# Patient Record
Sex: FEMALE | Race: WHITE | Employment: PART TIME | URBAN - METROPOLITAN AREA
[De-identification: names, ages, dates, MRNs, and addresses within clinical notes are randomized per-mention and may not be internally consistent; named-entity substitution may affect disease eponyms.]

---

## 2017-06-12 PROBLEM — M19.012 OSTEOARTHRITIS OF LEFT SHOULDER: Status: ACTIVE | Noted: 2017-06-12

## 2018-04-30 RX ORDER — CLINDAMYCIN HYDROCHLORIDE 300 MG/1
300 CAPSULE ORAL SEE ADMIN INSTRUCTIONS
COMMUNITY

## 2018-04-30 RX ORDER — AMMONIUM LACTATE 12 G/100G
LOTION TOPICAL
Refills: 0 | COMMUNITY
Start: 2018-04-09

## 2018-04-30 RX ORDER — GLIMEPIRIDE 2 MG/1
2 TABLET ORAL
COMMUNITY

## 2018-05-08 ENCOUNTER — ANESTHESIA EVENT (OUTPATIENT)
Dept: OPERATING ROOM | Age: 73
End: 2018-05-08
Payer: MEDICARE

## 2018-05-08 ENCOUNTER — HOSPITAL ENCOUNTER (OUTPATIENT)
Age: 73
Setting detail: OUTPATIENT SURGERY
Discharge: HOME | End: 2018-05-08
Attending: SURGERY | Admitting: SURGERY
Payer: MEDICARE

## 2018-05-08 ENCOUNTER — ANESTHESIA (OUTPATIENT)
Dept: OPERATING ROOM | Age: 73
End: 2018-05-08
Payer: MEDICARE

## 2018-05-08 VITALS
HEART RATE: 88 BPM | DIASTOLIC BLOOD PRESSURE: 74 MMHG | BODY MASS INDEX: 38.45 KG/M2 | SYSTOLIC BLOOD PRESSURE: 165 MMHG | TEMPERATURE: 97.1 F | RESPIRATION RATE: 16 BRPM | WEIGHT: 245 LBS | OXYGEN SATURATION: 95 % | HEIGHT: 67 IN

## 2018-05-08 VITALS
OXYGEN SATURATION: 94 % | DIASTOLIC BLOOD PRESSURE: 96 MMHG | SYSTOLIC BLOOD PRESSURE: 189 MMHG | RESPIRATION RATE: 4 BRPM | TEMPERATURE: 96.6 F

## 2018-05-08 DIAGNOSIS — K43.2 RECURRENT VENTRAL INCISIONAL HERNIA: Primary | ICD-10-CM

## 2018-05-08 DIAGNOSIS — G89.18 POSTOPERATIVE ABDOMINAL PAIN: ICD-10-CM

## 2018-05-08 DIAGNOSIS — Z98.890 STATUS POST REPAIR OF RECURRENT VENTRAL HERNIA: ICD-10-CM

## 2018-05-08 DIAGNOSIS — R10.9 POSTOPERATIVE ABDOMINAL PAIN: ICD-10-CM

## 2018-05-08 DIAGNOSIS — Z87.19 STATUS POST REPAIR OF RECURRENT VENTRAL HERNIA: ICD-10-CM

## 2018-05-08 LAB
ANION GAP SERPL CALCULATED.3IONS-SCNC: 16 MMOL/L (ref 7–16)
BUN BLDV-MCNC: 25 MG/DL (ref 8–23)
CALCIUM SERPL-MCNC: 9.3 MG/DL (ref 8.6–10.2)
CHLORIDE BLD-SCNC: 99 MMOL/L (ref 98–107)
CO2: 23 MMOL/L (ref 22–29)
CREAT SERPL-MCNC: 0.7 MG/DL (ref 0.5–1)
GFR AFRICAN AMERICAN: >60
GFR NON-AFRICAN AMERICAN: >60 ML/MIN/1.73
GLUCOSE BLD-MCNC: 169 MG/DL (ref 74–109)
HCT VFR BLD CALC: 37.8 % (ref 34–48)
HEMOGLOBIN: 12.6 G/DL (ref 11.5–15.5)
MCH RBC QN AUTO: 30.4 PG (ref 26–35)
MCHC RBC AUTO-ENTMCNC: 33.3 % (ref 32–34.5)
MCV RBC AUTO: 91.1 FL (ref 80–99.9)
METER GLUCOSE: 155 MG/DL (ref 70–110)
PDW BLD-RTO: 13.2 FL (ref 11.5–15)
PLATELET # BLD: 166 E9/L (ref 130–450)
PMV BLD AUTO: 11.2 FL (ref 7–12)
POTASSIUM SERPL-SCNC: 3.7 MMOL/L (ref 3.5–5)
RBC # BLD: 4.15 E12/L (ref 3.5–5.5)
SODIUM BLD-SCNC: 138 MMOL/L (ref 132–146)
WBC # BLD: 7.8 E9/L (ref 4.5–11.5)

## 2018-05-08 PROCEDURE — 6370000000 HC RX 637 (ALT 250 FOR IP): Performed by: ANESTHESIOLOGY

## 2018-05-08 PROCEDURE — L0625 LO FLEX L1-BELOW L5 PRE OTS: HCPCS | Performed by: SURGERY

## 2018-05-08 PROCEDURE — 6360000002 HC RX W HCPCS: Performed by: ANESTHESIOLOGY

## 2018-05-08 PROCEDURE — 3700000001 HC ADD 15 MINUTES (ANESTHESIA): Performed by: SURGERY

## 2018-05-08 PROCEDURE — 80048 BASIC METABOLIC PNL TOTAL CA: CPT

## 2018-05-08 PROCEDURE — 85027 COMPLETE CBC AUTOMATED: CPT

## 2018-05-08 PROCEDURE — 82962 GLUCOSE BLOOD TEST: CPT

## 2018-05-08 PROCEDURE — 2580000003 HC RX 258: Performed by: SURGERY

## 2018-05-08 PROCEDURE — 2500000003 HC RX 250 WO HCPCS: Performed by: SURGERY

## 2018-05-08 PROCEDURE — 6360000002 HC RX W HCPCS: Performed by: NURSE ANESTHETIST, CERTIFIED REGISTERED

## 2018-05-08 PROCEDURE — C1781 MESH (IMPLANTABLE): HCPCS | Performed by: SURGERY

## 2018-05-08 PROCEDURE — 3700000000 HC ANESTHESIA ATTENDED CARE: Performed by: SURGERY

## 2018-05-08 PROCEDURE — 3600000013 HC SURGERY LEVEL 3 ADDTL 15MIN: Performed by: SURGERY

## 2018-05-08 PROCEDURE — 7100000010 HC PHASE II RECOVERY - FIRST 15 MIN: Performed by: SURGERY

## 2018-05-08 PROCEDURE — 7100000000 HC PACU RECOVERY - FIRST 15 MIN: Performed by: SURGERY

## 2018-05-08 PROCEDURE — 7100000011 HC PHASE II RECOVERY - ADDTL 15 MIN: Performed by: SURGERY

## 2018-05-08 PROCEDURE — 36415 COLL VENOUS BLD VENIPUNCTURE: CPT

## 2018-05-08 PROCEDURE — 7100000001 HC PACU RECOVERY - ADDTL 15 MIN: Performed by: SURGERY

## 2018-05-08 PROCEDURE — 2500000003 HC RX 250 WO HCPCS: Performed by: NURSE ANESTHETIST, CERTIFIED REGISTERED

## 2018-05-08 PROCEDURE — 2580000003 HC RX 258: Performed by: NURSE ANESTHETIST, CERTIFIED REGISTERED

## 2018-05-08 PROCEDURE — 2720000010 HC SURG SUPPLY STERILE: Performed by: SURGERY

## 2018-05-08 PROCEDURE — 3600000003 HC SURGERY LEVEL 3 BASE: Performed by: SURGERY

## 2018-05-08 DEVICE — MESH HERN W6XL8IN ELLIPSE W/ ECHO PS POS SYS VENTRALIGHT ST: Type: IMPLANTABLE DEVICE | Site: ABDOMEN | Status: FUNCTIONAL

## 2018-05-08 RX ORDER — MORPHINE SULFATE 2 MG/ML
1 INJECTION, SOLUTION INTRAMUSCULAR; INTRAVENOUS EVERY 5 MIN PRN
Status: DISCONTINUED | OUTPATIENT
Start: 2018-05-08 | End: 2018-05-08 | Stop reason: HOSPADM

## 2018-05-08 RX ORDER — SODIUM CHLORIDE 9 MG/ML
INJECTION, SOLUTION INTRAVENOUS CONTINUOUS
Status: DISCONTINUED | OUTPATIENT
Start: 2018-05-08 | End: 2018-05-08 | Stop reason: HOSPADM

## 2018-05-08 RX ORDER — DEXAMETHASONE SODIUM PHOSPHATE 4 MG/ML
INJECTION, SOLUTION INTRA-ARTICULAR; INTRALESIONAL; INTRAMUSCULAR; INTRAVENOUS; SOFT TISSUE PRN
Status: DISCONTINUED | OUTPATIENT
Start: 2018-05-08 | End: 2018-05-08 | Stop reason: SDUPTHER

## 2018-05-08 RX ORDER — ROCURONIUM BROMIDE 10 MG/ML
INJECTION, SOLUTION INTRAVENOUS PRN
Status: DISCONTINUED | OUTPATIENT
Start: 2018-05-08 | End: 2018-05-08 | Stop reason: SDUPTHER

## 2018-05-08 RX ORDER — MORPHINE SULFATE 2 MG/ML
2 INJECTION, SOLUTION INTRAMUSCULAR; INTRAVENOUS EVERY 5 MIN PRN
Status: DISCONTINUED | OUTPATIENT
Start: 2018-05-08 | End: 2018-05-08 | Stop reason: HOSPADM

## 2018-05-08 RX ORDER — GLYCOPYRROLATE 0.2 MG/ML
INJECTION INTRAMUSCULAR; INTRAVENOUS PRN
Status: DISCONTINUED | OUTPATIENT
Start: 2018-05-08 | End: 2018-05-08 | Stop reason: SDUPTHER

## 2018-05-08 RX ORDER — PROMETHAZINE HYDROCHLORIDE 25 MG/ML
6.25 INJECTION, SOLUTION INTRAMUSCULAR; INTRAVENOUS EVERY 10 MIN PRN
Status: DISCONTINUED | OUTPATIENT
Start: 2018-05-08 | End: 2018-05-08 | Stop reason: HOSPADM

## 2018-05-08 RX ORDER — OXYCODONE HYDROCHLORIDE AND ACETAMINOPHEN 5; 325 MG/1; MG/1
1 TABLET ORAL EVERY 6 HOURS PRN
Qty: 28 TABLET | Refills: 0 | Status: SHIPPED | OUTPATIENT
Start: 2018-05-08 | End: 2018-05-15

## 2018-05-08 RX ORDER — MIDAZOLAM HYDROCHLORIDE 1 MG/ML
INJECTION INTRAMUSCULAR; INTRAVENOUS PRN
Status: DISCONTINUED | OUTPATIENT
Start: 2018-05-08 | End: 2018-05-08 | Stop reason: SDUPTHER

## 2018-05-08 RX ORDER — SODIUM CHLORIDE, SODIUM LACTATE, POTASSIUM CHLORIDE, CALCIUM CHLORIDE 600; 310; 30; 20 MG/100ML; MG/100ML; MG/100ML; MG/100ML
INJECTION, SOLUTION INTRAVENOUS CONTINUOUS PRN
Status: DISCONTINUED | OUTPATIENT
Start: 2018-05-08 | End: 2018-05-08 | Stop reason: SDUPTHER

## 2018-05-08 RX ORDER — LIDOCAINE HYDROCHLORIDE 20 MG/ML
INJECTION, SOLUTION INFILTRATION; PERINEURAL PRN
Status: DISCONTINUED | OUTPATIENT
Start: 2018-05-08 | End: 2018-05-08 | Stop reason: SDUPTHER

## 2018-05-08 RX ORDER — MEPERIDINE HYDROCHLORIDE 25 MG/ML
12.5 INJECTION INTRAMUSCULAR; INTRAVENOUS; SUBCUTANEOUS EVERY 5 MIN PRN
Status: DISCONTINUED | OUTPATIENT
Start: 2018-05-08 | End: 2018-05-08 | Stop reason: HOSPADM

## 2018-05-08 RX ORDER — BUPIVACAINE HYDROCHLORIDE 5 MG/ML
INJECTION, SOLUTION EPIDURAL; INTRACAUDAL PRN
Status: DISCONTINUED | OUTPATIENT
Start: 2018-05-08 | End: 2018-05-08 | Stop reason: HOSPADM

## 2018-05-08 RX ORDER — CLINDAMYCIN PHOSPHATE 600 MG/50ML
600 INJECTION INTRAVENOUS
Status: COMPLETED | OUTPATIENT
Start: 2018-05-08 | End: 2018-05-08

## 2018-05-08 RX ORDER — HYDROCODONE BITARTRATE AND ACETAMINOPHEN 5; 325 MG/1; MG/1
2 TABLET ORAL PRN
Status: DISCONTINUED | OUTPATIENT
Start: 2018-05-08 | End: 2018-05-08 | Stop reason: HOSPADM

## 2018-05-08 RX ORDER — ONDANSETRON 2 MG/ML
INJECTION INTRAMUSCULAR; INTRAVENOUS PRN
Status: DISCONTINUED | OUTPATIENT
Start: 2018-05-08 | End: 2018-05-08 | Stop reason: SDUPTHER

## 2018-05-08 RX ORDER — SODIUM CHLORIDE 0.9 % (FLUSH) 0.9 %
10 SYRINGE (ML) INJECTION EVERY 12 HOURS SCHEDULED
Status: DISCONTINUED | OUTPATIENT
Start: 2018-05-08 | End: 2018-05-08 | Stop reason: HOSPADM

## 2018-05-08 RX ORDER — HYDROCODONE BITARTRATE AND ACETAMINOPHEN 5; 325 MG/1; MG/1
2 TABLET ORAL PRN
Status: COMPLETED | OUTPATIENT
Start: 2018-05-08 | End: 2018-05-08

## 2018-05-08 RX ORDER — HYDROCODONE BITARTRATE AND ACETAMINOPHEN 5; 325 MG/1; MG/1
1 TABLET ORAL PRN
Status: COMPLETED | OUTPATIENT
Start: 2018-05-08 | End: 2018-05-08

## 2018-05-08 RX ORDER — PROPOFOL 10 MG/ML
INJECTION, EMULSION INTRAVENOUS PRN
Status: DISCONTINUED | OUTPATIENT
Start: 2018-05-08 | End: 2018-05-08 | Stop reason: SDUPTHER

## 2018-05-08 RX ORDER — HYDROCODONE BITARTRATE AND ACETAMINOPHEN 5; 325 MG/1; MG/1
1 TABLET ORAL PRN
Status: DISCONTINUED | OUTPATIENT
Start: 2018-05-08 | End: 2018-05-08 | Stop reason: HOSPADM

## 2018-05-08 RX ORDER — LIDOCAINE HYDROCHLORIDE 10 MG/ML
INJECTION, SOLUTION EPIDURAL; INFILTRATION; INTRACAUDAL; PERINEURAL PRN
Status: DISCONTINUED | OUTPATIENT
Start: 2018-05-08 | End: 2018-05-08 | Stop reason: HOSPADM

## 2018-05-08 RX ORDER — FENTANYL CITRATE 50 UG/ML
INJECTION, SOLUTION INTRAMUSCULAR; INTRAVENOUS PRN
Status: DISCONTINUED | OUTPATIENT
Start: 2018-05-08 | End: 2018-05-08 | Stop reason: SDUPTHER

## 2018-05-08 RX ADMIN — MIDAZOLAM HYDROCHLORIDE 2 MG: 1 INJECTION, SOLUTION INTRAMUSCULAR; INTRAVENOUS at 08:53

## 2018-05-08 RX ADMIN — DEXAMETHASONE SODIUM PHOSPHATE 8 MG: 4 INJECTION, SOLUTION INTRA-ARTICULAR; INTRALESIONAL; INTRAMUSCULAR; INTRAVENOUS; SOFT TISSUE at 09:11

## 2018-05-08 RX ADMIN — FENTANYL CITRATE 100 MCG: 50 INJECTION, SOLUTION INTRAMUSCULAR; INTRAVENOUS at 08:55

## 2018-05-08 RX ADMIN — HYDROMORPHONE HYDROCHLORIDE 0.5 MG: 1 INJECTION, SOLUTION INTRAMUSCULAR; INTRAVENOUS; SUBCUTANEOUS at 10:24

## 2018-05-08 RX ADMIN — HYDROMORPHONE HYDROCHLORIDE 0.5 MG: 1 INJECTION, SOLUTION INTRAMUSCULAR; INTRAVENOUS; SUBCUTANEOUS at 10:29

## 2018-05-08 RX ADMIN — LIDOCAINE HYDROCHLORIDE 80 MG: 20 INJECTION, SOLUTION INFILTRATION; PERINEURAL at 08:55

## 2018-05-08 RX ADMIN — SUGAMMADEX 300 MG: 100 INJECTION, SOLUTION INTRAVENOUS at 09:47

## 2018-05-08 RX ADMIN — HYDROCODONE BITARTRATE AND ACETAMINOPHEN 1 TABLET: 5; 325 TABLET ORAL at 12:16

## 2018-05-08 RX ADMIN — HYDROMORPHONE HYDROCHLORIDE 0.5 MG: 1 INJECTION, SOLUTION INTRAMUSCULAR; INTRAVENOUS; SUBCUTANEOUS at 10:38

## 2018-05-08 RX ADMIN — ROCURONIUM BROMIDE 40 MG: 10 SOLUTION INTRAVENOUS at 08:55

## 2018-05-08 RX ADMIN — ONDANSETRON 4 MG: 2 INJECTION, SOLUTION INTRAMUSCULAR; INTRAVENOUS at 09:11

## 2018-05-08 RX ADMIN — SODIUM CHLORIDE: 9 INJECTION, SOLUTION INTRAVENOUS at 07:42

## 2018-05-08 RX ADMIN — MORPHINE SULFATE 2 MG: 2 INJECTION, SOLUTION INTRAMUSCULAR; INTRAVENOUS at 10:10

## 2018-05-08 RX ADMIN — PROPOFOL 150 MG: 10 INJECTION, EMULSION INTRAVENOUS at 08:55

## 2018-05-08 RX ADMIN — MORPHINE SULFATE 2 MG: 2 INJECTION, SOLUTION INTRAMUSCULAR; INTRAVENOUS at 10:03

## 2018-05-08 RX ADMIN — CLINDAMYCIN PHOSPHATE 600 MG: 600 INJECTION INTRAVENOUS at 08:48

## 2018-05-08 RX ADMIN — PROMETHAZINE HYDROCHLORIDE 6.25 MG: 25 INJECTION INTRAMUSCULAR; INTRAVENOUS at 12:38

## 2018-05-08 RX ADMIN — FENTANYL CITRATE 50 MCG: 50 INJECTION, SOLUTION INTRAMUSCULAR; INTRAVENOUS at 09:10

## 2018-05-08 RX ADMIN — SODIUM CHLORIDE, POTASSIUM CHLORIDE, SODIUM LACTATE AND CALCIUM CHLORIDE: 600; 310; 30; 20 INJECTION, SOLUTION INTRAVENOUS at 08:50

## 2018-05-08 RX ADMIN — NEOSTIGMINE METHYLSULFATE 3 MG: 0.5 INJECTION, SOLUTION INTRAMUSCULAR; INTRAVENOUS; SUBCUTANEOUS at 09:31

## 2018-05-08 RX ADMIN — Medication 0.6 MG: at 09:31

## 2018-05-08 ASSESSMENT — PULMONARY FUNCTION TESTS
PIF_VALUE: 34
PIF_VALUE: 33
PIF_VALUE: 29
PIF_VALUE: 39
PIF_VALUE: 2
PIF_VALUE: 2
PIF_VALUE: 33
PIF_VALUE: 34
PIF_VALUE: 17
PIF_VALUE: 21
PIF_VALUE: 27
PIF_VALUE: 11
PIF_VALUE: 28
PIF_VALUE: 33
PIF_VALUE: 0
PIF_VALUE: 34
PIF_VALUE: 1
PIF_VALUE: 7
PIF_VALUE: 5
PIF_VALUE: 33
PIF_VALUE: 31
PIF_VALUE: 1
PIF_VALUE: 34
PIF_VALUE: 30
PIF_VALUE: 34
PIF_VALUE: 2
PIF_VALUE: 34
PIF_VALUE: 28
PIF_VALUE: 29
PIF_VALUE: 0
PIF_VALUE: 29
PIF_VALUE: 3
PIF_VALUE: 6
PIF_VALUE: 2
PIF_VALUE: 32
PIF_VALUE: 5
PIF_VALUE: 28
PIF_VALUE: 1
PIF_VALUE: 28
PIF_VALUE: 28
PIF_VALUE: 0
PIF_VALUE: 34
PIF_VALUE: 33
PIF_VALUE: 28
PIF_VALUE: 33
PIF_VALUE: 22
PIF_VALUE: 28
PIF_VALUE: 34
PIF_VALUE: 4
PIF_VALUE: 3
PIF_VALUE: 37
PIF_VALUE: 29
PIF_VALUE: 37
PIF_VALUE: 17
PIF_VALUE: 33
PIF_VALUE: 5
PIF_VALUE: 29
PIF_VALUE: 29
PIF_VALUE: 1
PIF_VALUE: 6
PIF_VALUE: 27
PIF_VALUE: 33

## 2018-05-08 ASSESSMENT — PAIN DESCRIPTION - DESCRIPTORS: DESCRIPTORS: DISCOMFORT

## 2018-05-08 ASSESSMENT — PAIN SCALES - GENERAL
PAINLEVEL_OUTOF10: 7
PAINLEVEL_OUTOF10: 9
PAINLEVEL_OUTOF10: 9
PAINLEVEL_OUTOF10: 8
PAINLEVEL_OUTOF10: 6
PAINLEVEL_OUTOF10: 10
PAINLEVEL_OUTOF10: 8

## 2018-05-08 ASSESSMENT — PAIN DESCRIPTION - PAIN TYPE: TYPE: SURGICAL PAIN

## 2018-05-08 ASSESSMENT — PAIN DESCRIPTION - LOCATION: LOCATION: ABDOMEN

## 2018-07-13 ENCOUNTER — APPOINTMENT (OUTPATIENT)
Dept: GENERAL RADIOLOGY | Age: 73
End: 2018-07-13
Payer: MEDICARE

## 2018-07-13 ENCOUNTER — HOSPITAL ENCOUNTER (EMERGENCY)
Age: 73
Discharge: HOME | End: 2018-07-13
Payer: MEDICARE

## 2018-07-13 VITALS
TEMPERATURE: 97.8 F | HEART RATE: 62 BPM | SYSTOLIC BLOOD PRESSURE: 142 MMHG | DIASTOLIC BLOOD PRESSURE: 95 MMHG | RESPIRATION RATE: 14 BRPM | HEIGHT: 68 IN | WEIGHT: 245 LBS | OXYGEN SATURATION: 98 % | BODY MASS INDEX: 37.13 KG/M2

## 2018-07-13 DIAGNOSIS — S62.102A CLOSED FRACTURE OF LEFT WRIST, INITIAL ENCOUNTER: Primary | ICD-10-CM

## 2018-07-13 PROCEDURE — 2580000003 HC RX 258: Performed by: PHYSICIAN ASSISTANT

## 2018-07-13 PROCEDURE — 73110 X-RAY EXAM OF WRIST: CPT

## 2018-07-13 PROCEDURE — 99283 EMERGENCY DEPT VISIT LOW MDM: CPT

## 2018-07-13 PROCEDURE — 6360000002 HC RX W HCPCS: Performed by: PHYSICIAN ASSISTANT

## 2018-07-13 PROCEDURE — 96374 THER/PROPH/DIAG INJ IV PUSH: CPT

## 2018-07-13 PROCEDURE — 96375 TX/PRO/DX INJ NEW DRUG ADDON: CPT

## 2018-07-13 RX ORDER — DIPHENHYDRAMINE HYDROCHLORIDE 50 MG/ML
12.5 INJECTION INTRAMUSCULAR; INTRAVENOUS ONCE
Status: COMPLETED | OUTPATIENT
Start: 2018-07-13 | End: 2018-07-13

## 2018-07-13 RX ORDER — FENTANYL CITRATE 50 UG/ML
25 INJECTION, SOLUTION INTRAMUSCULAR; INTRAVENOUS ONCE
Status: COMPLETED | OUTPATIENT
Start: 2018-07-13 | End: 2018-07-13

## 2018-07-13 RX ORDER — 0.9 % SODIUM CHLORIDE 0.9 %
10 VIAL (ML) INJECTION PRN
Status: DISCONTINUED | OUTPATIENT
Start: 2018-07-13 | End: 2018-07-13 | Stop reason: HOSPADM

## 2018-07-13 RX ORDER — ONDANSETRON 2 MG/ML
4 INJECTION INTRAMUSCULAR; INTRAVENOUS ONCE
Status: COMPLETED | OUTPATIENT
Start: 2018-07-13 | End: 2018-07-13

## 2018-07-13 RX ADMIN — FENTANYL CITRATE 25 MCG: 50 INJECTION, SOLUTION INTRAMUSCULAR; INTRAVENOUS at 10:53

## 2018-07-13 RX ADMIN — ONDANSETRON 4 MG: 2 INJECTION, SOLUTION INTRAMUSCULAR; INTRAVENOUS at 10:51

## 2018-07-13 RX ADMIN — SODIUM CHLORIDE 10 ML: 9 INJECTION INTRAMUSCULAR; INTRAVENOUS; SUBCUTANEOUS at 10:51

## 2018-07-13 RX ADMIN — DIPHENHYDRAMINE HYDROCHLORIDE 12.5 MG: 50 INJECTION, SOLUTION INTRAMUSCULAR; INTRAVENOUS at 10:52

## 2018-07-13 ASSESSMENT — PAIN DESCRIPTION - DESCRIPTORS: DESCRIPTORS: SHARP

## 2018-07-13 ASSESSMENT — PAIN DESCRIPTION - PROGRESSION: CLINICAL_PROGRESSION: GRADUALLY WORSENING

## 2018-07-13 ASSESSMENT — PAIN DESCRIPTION - ONSET: ONSET: SUDDEN

## 2018-07-13 ASSESSMENT — PAIN SCALES - GENERAL
PAINLEVEL_OUTOF10: 10
PAINLEVEL_OUTOF10: 5
PAINLEVEL_OUTOF10: 10

## 2018-07-13 ASSESSMENT — PAIN DESCRIPTION - LOCATION: LOCATION: WRIST

## 2018-07-13 ASSESSMENT — PAIN DESCRIPTION - PAIN TYPE: TYPE: ACUTE PAIN

## 2018-07-13 ASSESSMENT — PAIN DESCRIPTION - FREQUENCY: FREQUENCY: CONTINUOUS

## 2018-07-13 ASSESSMENT — PAIN DESCRIPTION - ORIENTATION: ORIENTATION: LEFT

## 2018-07-13 NOTE — ED PROVIDER NOTES
Department of Emergency Medicine   ED  Provider Note  Admit Date/RoomTime: 7/13/2018 10:25 AM  ED Room: 09/09  Chief Complaint   Fall (tripped on side walk, denies gead injury or LOC. ) and Wrist Injury (left)    History of Present Illness   Source of history provided by:  patient. History/Exam Limitations: none. Kellen Lopez is a 67 y.o. old female who has a past medical history of:   Past Medical History:   Diagnosis Date    Anxiety     Diabetes ([address]     Hernia, abdominal     History of stomach ulcers     Hx of blood clots age 25s    related to BCP    Hyperlipidemia     Hypertension     Incisional hernia 05/2018    PVC's (premature ventricular contractions)     Rhonchi     history of    Shoulder pain, left     Thyroid disease     presents to the emergency department by private vehicle, for a fall which occured 1 hour(s) prior to arrival. She was reportedly walking while at home prior to incident with complaints of left wrist pain. The patients tetanus status is up to date. Since onset the symptoms have been severe in degree. Her pain is aggraveated by movement, use and palpation and relieved by nothing. She denies any head injury, loss of consciousness, neck pain, chest pain or abdominal pain. ROS    Pertinent positives and negatives are stated within HPI, all other systems reviewed and are negative. Past Surgical History:   Procedure Laterality Date    APPENDECTOMY      CATARACT REMOVAL WITH IMPLANT Bilateral     CHOLECYSTECTOMY      COLONOSCOPY      ENDOSCOPY, COLON, DIAGNOSTIC      HERNIA REPAIR  1984    incisional    [place] LAP, INCISIONAL HERNIA REPAIR,REDUCIBLE N/A 5/8/2018    INCISIONAL HERNIA REPAIR LAPAROSCOPIC WITH MESH performed by Aspen Cardoso MD at [facility] 114 Bilateral     TOTAL KNEE ARTHROPLASTY Right    Social History:  reports that she quit smoking about 42 years ago. Her smoking use included Cigarettes.  She mL (10 mLs Intravenous Given 7/13/18 1051)   ondansetron (ZOFRAN) injection 4 mg (4 mg Intravenous Given 7/13/18 1051)   diphenhydrAMINE (BENADRYL) injection 12.5 mg (12.5 mg Intravenous Given 7/13/18 1052)   fentaNYL (SUBLIMAZE) injection 25 mcg (25 mcg Intravenous Given 7/13/18 1053)        Procedure(s):   none    MDM:   Splint with sling and outpatient follow-up with orthopedics for casting of the distal radius ulna fracture. Patient has been seen by Dr. Santi Oro in the past therefore we will refer her to him for outpatient follow-up    Counseling: The emergency provider has spoken with the patient and spouse/SO and discussed todays results, in addition to providing specific details for the plan of care and counseling regarding the diagnosis and prognosis. Questions are answered at this time and they are agreeable with the plan. Assessment      1. Closed fracture of left wrist, initial encounter      Plan   Discharge to home  Patient condition is good    New Medications     Discharge Medication List as of 7/13/2018 11:33 AM        Electronically signed by DUARTE Mckeon   DD: 7/13/18  **This report was transcribed using voice recognition software. Every effort was made to ensure accuracy; however, inadvertent computerized transcription errors may be present.   END OF ED PROVIDER NOTE       Hershall Peabody, [address]  07/13/18 7611

## 2018-07-16 ENCOUNTER — OFFICE VISIT (OUTPATIENT)
Dept: ORTHOPEDIC SURGERY | Age: 73
End: 2018-07-16
Payer: MEDICARE

## 2018-07-16 VITALS
DIASTOLIC BLOOD PRESSURE: 70 MMHG | WEIGHT: 245 LBS | HEART RATE: 80 BPM | TEMPERATURE: 97.7 F | BODY MASS INDEX: 37.13 KG/M2 | SYSTOLIC BLOOD PRESSURE: 122 MMHG | HEIGHT: 68 IN

## 2018-07-16 DIAGNOSIS — S52.502A CLOSED FRACTURE OF DISTAL END OF LEFT RADIUS, UNSPECIFIED FRACTURE MORPHOLOGY, INITIAL ENCOUNTER: Primary | ICD-10-CM

## 2018-07-16 PROCEDURE — 25600 CLTX DST RDL FX/EPHYS SEP WO: CPT | Performed by: ORTHOPAEDIC SURGERY

## 2018-07-16 PROCEDURE — 99214 OFFICE O/P EST MOD 30 MIN: CPT | Performed by: ORTHOPAEDIC SURGERY

## 2018-07-16 RX ORDER — TRAMADOL HYDROCHLORIDE 50 MG/1
50 TABLET ORAL EVERY 6 HOURS PRN
Qty: 28 TABLET | Refills: 1 | Status: SHIPPED | OUTPATIENT
Start: 2018-07-16 | End: 2018-07-23

## 2018-07-16 NOTE — PROGRESS NOTES
area twice daily as needed  0    glimepiride (AMARYL) 2 MG tablet Take 2 mg by mouth Daily with supper      Acetaminophen (TYLENOL 8 HOUR ARTHRITIS PAIN PO) Take by mouth as needed      clindamycin (CLEOCIN) 300 MG capsule Take 300 mg by mouth See Admin Instructions Prior to procedures; contact Dr Greer Officer re: preop instructions      chlorthalidone (HYGROTON) 25 MG tablet Take 25 mg by mouth daily      glimepiride (AMARYL) 2 MG tablet Take 4 mg by mouth every morning (before breakfast)       levothyroxine (SYNTHROID) 100 MCG tablet Take 100 mcg by mouth daily       metFORMIN (GLUCOPHAGE) 500 MG tablet Take 500 mg by mouth 2 times daily (with meals)       simvastatin (ZOCOR) 40 MG tablet Take 40 mg by mouth nightly       potassium chloride (KLOR-CON M) 20 MEQ extended release tablet Take 20 mEq by mouth daily       montelukast (SINGULAIR) 10 MG tablet Take 10 mg by mouth nightly Takes as needed. Take am dos if needed      Multiple Vitamins-Minerals (CENTRUM ADULTS PO) Take 1 tablet by mouth daily Ld 05/02      losartan (COZAAR) 50 MG tablet Take 50 mg by mouth nightly       vitamin D (ERGOCALCIFEROL) 11836 UNITS CAPS capsule Take 50,000 Units by mouth every 30 days       finasteride (PROPECIA) 1 MG tablet Take 1 mg by mouth daily       Melatonin 10 MG TABS Take by mouth       No current facility-administered medications for this visit.         Allergies   Allergen Reactions    Prochlorperazine Edisylate Other (See Comments)    Prochlorperazine Maleate Other (See Comments)     dystonia    Ciprofloxacin Nausea And Vomiting and Rash    Codeine Nausea And Vomiting and Rash    Hydromorphone Palpitations and Other (See Comments)     \"panic I felt like I was having a heart attack\"    Penicillins Nausea And Vomiting and Rash    Shrimp Extract Allergy Skin Test Diarrhea and Nausea And Vomiting     Severe diarrhea    Sulfa Antibiotics Diarrhea, Nausea And Vomiting and Rash       Social History     Social short arm cast was applied carefully molding for fracture reduction. Patient tolerated well. Postreduction x-rays in cast as noted above are anatomic. Potential option for surgical fixation of the fracture if significant displacement occurs was discussed with the patient. She strongly prefers nonoperative management. She has been taking OTC analgesics with incomplete relief. She does have a history of codeine intolerance. Has taken tramadol the past without difficulty. Tramadol prescription written today.     Return in about 2 weeks (around 7/30/2018) for cast check and xray left wrist.       Bennett Osgood, MD    7/16/2018  9:54 AM

## 2018-07-30 ENCOUNTER — OFFICE VISIT (OUTPATIENT)
Dept: ORTHOPEDIC SURGERY | Age: 73
End: 2018-07-30

## 2018-07-30 VITALS
HEIGHT: 68 IN | WEIGHT: 245 LBS | BODY MASS INDEX: 37.13 KG/M2 | TEMPERATURE: 97.5 F | HEART RATE: 78 BPM | DIASTOLIC BLOOD PRESSURE: 81 MMHG | SYSTOLIC BLOOD PRESSURE: 134 MMHG

## 2018-07-30 DIAGNOSIS — S52.502D CLOSED FRACTURE OF DISTAL END OF LEFT RADIUS WITH ROUTINE HEALING, UNSPECIFIED FRACTURE MORPHOLOGY, SUBSEQUENT ENCOUNTER: Primary | ICD-10-CM

## 2018-07-30 PROCEDURE — 99024 POSTOP FOLLOW-UP VISIT: CPT | Performed by: ORTHOPAEDIC SURGERY

## 2018-07-30 NOTE — PROGRESS NOTES
breakfast)       levothyroxine (SYNTHROID) 100 MCG tablet Take 100 mcg by mouth daily       metFORMIN (GLUCOPHAGE) 500 MG tablet Take 500 mg by mouth 2 times daily (with meals)       simvastatin (ZOCOR) 40 MG tablet Take 40 mg by mouth nightly       potassium chloride (KLOR-CON M) 20 MEQ extended release tablet Take 20 mEq by mouth daily       montelukast (SINGULAIR) 10 MG tablet Take 10 mg by mouth nightly Takes as needed. Take am dos if needed      Multiple Vitamins-Minerals (CENTRUM ADULTS PO) Take 1 tablet by mouth daily Ld 05/02      losartan (COZAAR) 50 MG tablet Take 50 mg by mouth nightly       vitamin D (ERGOCALCIFEROL) 51102 UNITS CAPS capsule Take 50,000 Units by mouth every 30 days       finasteride (PROPECIA) 1 MG tablet Take 1 mg by mouth daily       Melatonin 10 MG TABS Take by mouth       No current facility-administered medications for this visit. Allergies   Allergen Reactions    Prochlorperazine Edisylate Other (See Comments)    Prochlorperazine Maleate Other (See Comments)     dystonia    Ciprofloxacin Nausea And Vomiting and Rash    Codeine Nausea And Vomiting and Rash    Hydromorphone Palpitations and Other (See Comments)     \"panic I felt like I was having a heart attack\"    Penicillins Nausea And Vomiting and Rash    Shrimp Extract Allergy Skin Test Diarrhea and Nausea And Vomiting     Severe diarrhea    Sulfa Antibiotics Diarrhea, Nausea And Vomiting and Rash       Social History     Social History    Marital status:      Spouse name: N/A    Number of children: N/A    Years of education: N/A     Social History Main Topics    Smoking status: Former Smoker     Years: 7.00     Types: Cigarettes     Quit date: 1976    Smokeless tobacco: Never Used    Alcohol use 0.6 oz/week     1 Glasses of wine per week    Drug use: No    Sexual activity: Not Asked     Other Topics Concern    None     Social History Narrative    None       History reviewed.  No pertinent family history. Review of Systems   No fever, chills, or other constitutional symptoms. No numbness or other neuro symptoms. Ortho Exam left short arm cast fitting well. Minimal finger edema near full finger range of motion. There is some tightness about the base of the thumb. Physical Exam    Patient is alert and oriented. Well-developed well-nourished. Pupils equal and reactive. Sclerae anicteric. Neck supple  Lungs clear. Cardiac rate and rhythm regular. Abdomen soft and nontender. Skin warm and dry. XRAY: left wrist x-ray and cast today AP and lateral views. On the AP view alignment is anatomic with good length of the radius and congruence of the articular surface. On the lateral view there is a fracture line visible in the volar metaphyseal cortex with less than 2 mm of shortening. Articular surface is congruent. Impression: Healing left distal radius fracture in near anatomic alignment. ASSESSMENT/PLAN:    Amadeo Velez was seen today for wrist injury. Diagnoses and all orders for this visit:    Closed fracture of distal end of left radius with routine healing, unspecified fracture morphology, subsequent encounter  -     XR WRIST LEFT (2 VIEWS); Future     cast edge was modified to relieve the local thumb pressure. If she has any other cast tissue she will follow-up for possible cast change. Otherwise follow up in 2 weeks.     Return in about 2 weeks (around 8/13/2018) for exam and xray out of cast left wrist.       Bennett Osgood, MD    7/30/2018  9:45 AM

## 2018-08-13 ENCOUNTER — OFFICE VISIT (OUTPATIENT)
Dept: ORTHOPEDIC SURGERY | Age: 73
End: 2018-08-13
Payer: MEDICARE

## 2018-08-13 VITALS
DIASTOLIC BLOOD PRESSURE: 83 MMHG | HEART RATE: 83 BPM | TEMPERATURE: 97 F | HEIGHT: 68 IN | SYSTOLIC BLOOD PRESSURE: 133 MMHG | WEIGHT: 245 LBS | BODY MASS INDEX: 37.13 KG/M2

## 2018-08-13 DIAGNOSIS — S52.502D CLOSED FRACTURE OF DISTAL END OF LEFT RADIUS WITH ROUTINE HEALING, UNSPECIFIED FRACTURE MORPHOLOGY, SUBSEQUENT ENCOUNTER: Primary | ICD-10-CM

## 2018-08-13 PROCEDURE — L3908 WHO COCK-UP NONMOLDE PRE OTS: HCPCS | Performed by: ORTHOPAEDIC SURGERY

## 2018-08-13 PROCEDURE — 99024 POSTOP FOLLOW-UP VISIT: CPT | Performed by: ORTHOPAEDIC SURGERY

## 2018-08-13 NOTE — PROGRESS NOTES
Chief Complaint:   Chief Complaint   Patient presents with    Wrist Injury     F/u left wrist fx       HPI 4 weeks after left distal radius fracture. Pain primarily over the radial aspect and thumb.     Patient Active Problem List   Diagnosis    Osteoarthritis of left shoulder    Closed fracture of lower end of left radius with routine healing       Past Medical History:   Diagnosis Date    Anxiety     Diabetes ([address]     Hernia, abdominal     History of stomach ulcers     Hx of blood clots age 25s    related to BCP    Hyperlipidemia     Hypertension     Incisional hernia 05/2018    PVC's (premature ventricular contractions)     Rhonchi     history of    Shoulder pain, left     Thyroid disease        Past Surgical History:   Procedure Laterality Date    APPENDECTOMY      CATARACT REMOVAL WITH IMPLANT Bilateral     CHOLECYSTECTOMY      COLONOSCOPY      ENDOSCOPY, COLON, DIAGNOSTIC      HERNIA REPAIR  1984    incisional    [place] LAP, INCISIONAL HERNIA REPAIR,REDUCIBLE N/A 5/8/2018    INCISIONAL HERNIA REPAIR LAPAROSCOPIC WITH MESH performed by Michael Anthony MD at [facility] 114 Bilateral     TOTAL KNEE ARTHROPLASTY Right        Current Outpatient Prescriptions   Medication Sig Dispense Refill    ammonium lactate (LAC-HYDRIN) 12 % lotion Apply to affected area twice daily as needed  0    glimepiride (AMARYL) 2 MG tablet Take 2 mg by mouth Daily with supper      Acetaminophen (TYLENOL 8 HOUR ARTHRITIS PAIN PO) Take by mouth as needed      clindamycin (CLEOCIN) 300 MG capsule Take 300 mg by mouth See Admin Instructions Prior to procedures; contact Dr Kadie Fierro re: preop instructions      chlorthalidone (HYGROTON) 25 MG tablet Take 25 mg by mouth daily      glimepiride (AMARYL) 2 MG tablet Take 4 mg by mouth every morning (before breakfast)       levothyroxine (SYNTHROID) 100 MCG tablet Take 100 mcg by mouth daily       metFORMIN (GLUCOPHAGE) 500 MG tablet Take

## 2018-08-13 NOTE — LETTER
[facility].  [address] [address]  Phone: [phone]  Fax: [phone]    Lee [place] MD        August 13, 2018     Patient: Nicho Summers   YOB: 1945   Date of Visit: 8/13/2018       To Whom It May Concern: It is my medical opinion that [address] [facility] permanent handicap parking due to hip and knee osteoarthritis. .    If you have any questions or concerns, please don't hesitate to call.     Sincerely,        Lee [place] MD

## 2018-09-10 ENCOUNTER — OFFICE VISIT (OUTPATIENT)
Dept: ORTHOPEDIC SURGERY | Age: 73
End: 2018-09-10

## 2018-09-10 VITALS
WEIGHT: 240 LBS | HEART RATE: 74 BPM | TEMPERATURE: 98.5 F | HEIGHT: 68 IN | BODY MASS INDEX: 36.37 KG/M2 | DIASTOLIC BLOOD PRESSURE: 81 MMHG | SYSTOLIC BLOOD PRESSURE: 135 MMHG

## 2018-09-10 DIAGNOSIS — S52.502D CLOSED FRACTURE OF DISTAL END OF LEFT RADIUS WITH ROUTINE HEALING, UNSPECIFIED FRACTURE MORPHOLOGY, SUBSEQUENT ENCOUNTER: Primary | ICD-10-CM

## 2018-09-10 PROCEDURE — 99024 POSTOP FOLLOW-UP VISIT: CPT | Performed by: ORTHOPAEDIC SURGERY

## 2018-09-10 RX ORDER — IBUPROFEN 200 MG
400 TABLET ORAL EVERY 6 HOURS PRN
COMMUNITY
End: 2021-09-13

## 2018-09-10 RX ORDER — TRAMADOL HYDROCHLORIDE 50 MG/1
50 TABLET ORAL NIGHTLY PRN
COMMUNITY
Start: 2018-08-13

## 2018-09-10 RX ORDER — ASCORBIC ACID 500 MG
500 TABLET ORAL DAILY
COMMUNITY

## 2018-09-10 NOTE — PROGRESS NOTES
0.6 oz/week     1 Glasses of wine per week    Drug use: No    Sexual activity: Not Asked     Other Topics Concern    None     Social History Narrative    None       History reviewed. No pertinent family history. Review of Systems   No fever, chills, or other constitutional symptoms. No numbness or other neuro symptoms. Ortho Exam Left wrist has mild diffuse soft tissue swelling, no erythema. No significant clinical deformity. Diffusely tender and apprehensive. Fingers demonstrate full Extension, 90% flexion. Full pronation, 60° supination. 15° dorsiflexion 10° palmar flexion limited by comfort. Physical Exam    Patient is alert and oriented. Well-developed well-nourished. Pupils equal and reactive. Sclerae anicteric. Neck supple  Lungs clear. Cardiac rate and rhythm regular. Abdomen soft and nontender. Skin warm and dry. XRAY: AP and lateral x-rays left wrist taken today. The metaphyseal fracture line is barely visible. Radial length is intact. Neutral alignment on lateral view without displacement. Impression: Healing left distal radius fracture in near anatomic alignment. ASSESSMENT/PLAN:    Florinda Primrose was seen today for wrist injury. Diagnoses and all orders for this visit:    Closed fracture of distal end of left radius with routine healing, unspecified fracture morphology, subsequent encounter  -     XR WRIST LEFT (2 VIEWS); Future  -     Ambulatory referral to Physical Therapy     Findings reviewed with the patient. Healing does appear to be satisfactory in good alignment. She may wean her brace as comfort allows. Outpatient physical therapy prescribed for range of motion strengthening and ADLs. Return in about 8 weeks (around 11/5/2018).        Juju Quach MD    9/10/2018  11:30 AM

## 2018-09-21 ENCOUNTER — EVALUATION (OUTPATIENT)
Dept: PHYSICAL THERAPY | Age: 73
End: 2018-09-21
Payer: MEDICARE

## 2018-09-21 DIAGNOSIS — S52.502D CLOSED FRACTURE OF DISTAL END OF LEFT RADIUS WITH ROUTINE HEALING, UNSPECIFIED FRACTURE MORPHOLOGY, SUBSEQUENT ENCOUNTER: Primary | ICD-10-CM

## 2018-09-21 PROCEDURE — G8984 CARRY CURRENT STATUS: HCPCS | Performed by: PHYSICAL THERAPIST

## 2018-09-21 PROCEDURE — G8985 CARRY GOAL STATUS: HCPCS | Performed by: PHYSICAL THERAPIST

## 2018-09-21 PROCEDURE — 97140 MANUAL THERAPY 1/> REGIONS: CPT | Performed by: PHYSICAL THERAPIST

## 2018-09-21 PROCEDURE — 97110 THERAPEUTIC EXERCISES: CPT | Performed by: PHYSICAL THERAPIST

## 2018-09-21 PROCEDURE — 97161 PT EVAL LOW COMPLEX 20 MIN: CPT | Performed by: PHYSICAL THERAPIST

## 2018-09-21 NOTE — PROGRESS NOTES
Scapular Elevation ° °  ° °       Scapular Retraction ° °  ° °       Scapular Protraction ° °  ° °          Elbow Flexion       5/5 3+/5    Elbow Extension       5/5 3+/5    Forearm Supination 90 55     5/5 2+/5    Forearm Pronation 90 85     5/5 3+/5    Wrist Extension 70 18     5/5 2/5    Wrist Flexion 65 10     5/5 2/5    Wrist Radial Deviation 15° 10°  ° °  5/5 2/5    Wrist Ulnar Deviation 30° 2°  ° °  5/5 2-/5        Neurological:       DTRs Right Left  Dermatome   Right Left     C5                    C5                       C6    C6   hypersensitive     C7    C7  hypersensitive     C8    C8  hypersensitive       Myotome                Right             Left           C5             C6             C7             C8       Special Tests/Functional Screens( with positive signs):      [] Speed's       [] Neer's                    []Momin-Gaudencio                []  Apprehension      []  Yerganson's        [] Nye's     []GH drawer  [] Bicep Load   []  Sulcus Sign [] Crank             [] Harland Grow                       [] Thana Coon     [] Mill's   [] Elbow Valgus        [] Elbow Varus                [] Other:     Related Screens: L  strength 10 and R 45 lbs     ASSESSMENT     Comments: Pt s/p L distal radial fx with with decreased AROM and strength LUE. Pt has mild swelling through L hand/wrist with hypersensitivity C6-C8 dermatomes. Tenderness is also noted throughout wrist and hand region. Due to pain and deficits pt reports significant loss in functional ability. It is recommended pt attend skilled therapy 3 times a week for 12 visits in order to reduce deficits so that she may return to previous level of function.      Skilled PT services required for this patient because of impairments in AROM, strength, tenderness, pain, hypersensitivity and swelling  resulting in loss of functional  tolerances/independence in gripping/grasping, dressing, lifting, cutting food, household duties, opening jars/bottles/cans, sleeping and typing    [x] There are no barriers  affecting POC or recovery      Expected treatment duration is: 3 times a week for 12 visits       [x] Functional limitations: Dressing, Lifting, Holding, Computer, Driving, Carrying, Sleeping and House Keeping      LTG: (to be met in 12  visits)  1. ? Pain:0/10 at best  And 3/10 at worst   2. ? ROM: AROM L wrist flexion 45°, extension 50°, UD 15°, RD 15°, supination 80°  3. ? Strength: LUE grossly 4/5  4. ? Function: able to /grasp in order to open jars/cans/bottles, able to lift and carry up to 15 lbs in order to perform household duties such as doing laundry, able to type without difficulty, able to drive with LUE, able to buckle bra I, able to sleep without disturbance, no difficulty cutting foot. 5. Independent with Home Exercise Programs  6. No limitations with job duties    PLAN       Treatment Plan:  [x] Therapeutic Exercise  [x] Modalities:  [x] Therapeutic Activity  [] Ultrasound  [] Electrical Stimulation  [] Gait Training   [] Massage/Fascial release    [] Lumbar/Cervical           Traction  [x] Neuromuscular Re-education [] Cold/hotpack [] Iontophoresis: 4 mg/mL  [x] Instruction in HEP             Dexamethasone Sodium  [x] Manual Therapy             Phosphate 40-80 mAmin  [] Pain Neuroscience                 [] Vasocompression/ [] Kinesiotaping; cupping            Game Ready   [] Integrative dry needling  [] Aerobic conditioning [] Work/Sport specific activities  []  Medication allergies reviewed for use of    Dexamethasone Sodium Phosphate 4mg/ml     with iontophoresis treatments. Pt is not allergic. Rehab Potential:   [x] Good  [] Fair  [] Poor   Suggested Professional Referral: [x] No  [] Yes:  Barriers to Goal Achievement[de-identified]   [x] No  [] Yes:  Domestic Concerns:     [x] No  [] Yes:    Patient.  Education:  [x] Anatomy/Dysfunction,Plans/Goals/Rehab Potential,                                                          Risks/Benefits discussed

## 2018-09-24 ENCOUNTER — TREATMENT (OUTPATIENT)
Dept: PHYSICAL THERAPY | Age: 73
End: 2018-09-24
Payer: MEDICARE

## 2018-09-24 DIAGNOSIS — S52.502D CLOSED FRACTURE OF DISTAL END OF LEFT RADIUS WITH ROUTINE HEALING, UNSPECIFIED FRACTURE MORPHOLOGY, SUBSEQUENT ENCOUNTER: Primary | ICD-10-CM

## 2018-09-24 PROCEDURE — 97140 MANUAL THERAPY 1/> REGIONS: CPT | Performed by: PHYSICAL THERAPIST

## 2018-09-24 PROCEDURE — 97110 THERAPEUTIC EXERCISES: CPT | Performed by: PHYSICAL THERAPIST

## 2018-09-27 ENCOUNTER — TREATMENT (OUTPATIENT)
Dept: PHYSICAL THERAPY | Age: 73
End: 2018-09-27
Payer: MEDICARE

## 2018-09-27 DIAGNOSIS — S52.502D CLOSED FRACTURE OF DISTAL END OF LEFT RADIUS WITH ROUTINE HEALING, UNSPECIFIED FRACTURE MORPHOLOGY, SUBSEQUENT ENCOUNTER: Primary | ICD-10-CM

## 2018-09-27 PROCEDURE — 97110 THERAPEUTIC EXERCISES: CPT | Performed by: PHYSICAL THERAPIST

## 2018-09-27 PROCEDURE — 97140 MANUAL THERAPY 1/> REGIONS: CPT | Performed by: PHYSICAL THERAPIST

## 2018-10-05 ENCOUNTER — TREATMENT (OUTPATIENT)
Dept: PHYSICAL THERAPY | Age: 73
End: 2018-10-05
Payer: MEDICARE

## 2018-10-05 DIAGNOSIS — M19.012 PRIMARY OSTEOARTHRITIS OF LEFT SHOULDER: ICD-10-CM

## 2018-10-05 DIAGNOSIS — S52.502D CLOSED FRACTURE OF DISTAL END OF LEFT RADIUS WITH ROUTINE HEALING, UNSPECIFIED FRACTURE MORPHOLOGY, SUBSEQUENT ENCOUNTER: Primary | ICD-10-CM

## 2018-10-05 PROCEDURE — 97150 GROUP THERAPEUTIC PROCEDURES: CPT | Performed by: PHYSICAL THERAPIST

## 2018-10-05 PROCEDURE — 97110 THERAPEUTIC EXERCISES: CPT | Performed by: PHYSICAL THERAPIST

## 2018-10-05 PROCEDURE — 97140 MANUAL THERAPY 1/> REGIONS: CPT | Performed by: PHYSICAL THERAPIST

## 2018-10-08 ENCOUNTER — TREATMENT (OUTPATIENT)
Dept: PHYSICAL THERAPY | Age: 73
End: 2018-10-08
Payer: MEDICARE

## 2018-10-08 DIAGNOSIS — M19.012 PRIMARY OSTEOARTHRITIS OF LEFT SHOULDER: ICD-10-CM

## 2018-10-08 DIAGNOSIS — S52.502D CLOSED FRACTURE OF DISTAL END OF LEFT RADIUS WITH ROUTINE HEALING, UNSPECIFIED FRACTURE MORPHOLOGY, SUBSEQUENT ENCOUNTER: Primary | ICD-10-CM

## 2018-10-08 PROCEDURE — 97140 MANUAL THERAPY 1/> REGIONS: CPT

## 2018-10-08 PROCEDURE — 97110 THERAPEUTIC EXERCISES: CPT

## 2018-10-12 ENCOUNTER — TREATMENT (OUTPATIENT)
Dept: PHYSICAL THERAPY | Age: 73
End: 2018-10-12
Payer: MEDICARE

## 2018-10-12 DIAGNOSIS — M19.012 PRIMARY OSTEOARTHRITIS OF LEFT SHOULDER: ICD-10-CM

## 2018-10-12 DIAGNOSIS — S52.502D CLOSED FRACTURE OF DISTAL END OF LEFT RADIUS WITH ROUTINE HEALING, UNSPECIFIED FRACTURE MORPHOLOGY, SUBSEQUENT ENCOUNTER: Primary | ICD-10-CM

## 2018-10-12 PROCEDURE — 97150 GROUP THERAPEUTIC PROCEDURES: CPT

## 2018-10-12 PROCEDURE — 97110 THERAPEUTIC EXERCISES: CPT

## 2018-10-15 ENCOUNTER — TREATMENT (OUTPATIENT)
Dept: PHYSICAL THERAPY | Age: 73
End: 2018-10-15
Payer: MEDICARE

## 2018-10-15 DIAGNOSIS — S52.502D CLOSED FRACTURE OF DISTAL END OF LEFT RADIUS WITH ROUTINE HEALING, UNSPECIFIED FRACTURE MORPHOLOGY, SUBSEQUENT ENCOUNTER: Primary | ICD-10-CM

## 2018-10-15 DIAGNOSIS — M19.012 PRIMARY OSTEOARTHRITIS OF LEFT SHOULDER: ICD-10-CM

## 2018-10-15 PROCEDURE — 97110 THERAPEUTIC EXERCISES: CPT

## 2018-10-15 NOTE — PROGRESS NOTES
PHYSICAL THERAPY DAILY TREATMENT NOTE    Date:  10/15/2018   Patient: Girish Patterson  : 1945  MRN: [id number]  Medical Diagnosis: Primary osteoarthritis of left shoulder [M19.012]  Treatment Diagnosis: M25.612 M62.512 M25.512  Physician: Marylou Gregory MD  [address] [facility], [facility]                                  Medical Diagnosis: Closed fracture of distal end of left radius with routine healing, unspecified fracture morphology, subsequent encounter [S54.825D]  Treatment Diagnosis: M25.632, M62.532  Reason for referral/Mechanism of Injury: Pt is 66 y/o female s/p L distal radial fx secondary to fall 18. Pt reports continued pain in L wrist/hand with stiffness, lack of mobility and swelling. Pt also reports tenderness to the touch. Onset date: 18      SUBJECTIVE     Pain:  [] Yes  [] No Location:   Pain Rating: (0-10 scale) /10    Daily Comments: \"Better. Not as afraid. \"  Now able to lift light pans off the stove, but unable to lift a roasting pan. Outcomes:     OBJECTIVE     See rehab sheet for today's treatment    Time In:1132           Time Out: 1230            Visit #: 7    Treatment Charges: Mins Units   Modalities     Ther Exercise 58 4   Manual Therapy     Thera Activities     ADL/Home Mgt      Neuro Re-education     Gait Training     Group Therapy     Non-Billable Service Time     Other     Total Time/Units 58 4     [] Group therapy/ non-billed for portions of treatment that were considered simultaneous activities per certain provider guidelines. Palpation:  ROM:  Strength:  Joint Integrity:    ASSESSMENT       [x] Progressing toward goals. [] No change  [x] Comment: Improved functional ability per S. Held MTT to focus on progression of HEP, self care. Pt challenged with progression. Pt. Education:  [] Yes  [] No  [] Reviewed Prior HEP/Ed  Method of Education: [] Verbal  [] Demo  [] Written  Comprehension of Education:  [] Verbalizes understanding.   []

## 2018-10-19 ENCOUNTER — TREATMENT (OUTPATIENT)
Dept: PHYSICAL THERAPY | Age: 73
End: 2018-10-19
Payer: MEDICARE

## 2018-10-19 DIAGNOSIS — M19.012 PRIMARY OSTEOARTHRITIS OF LEFT SHOULDER: ICD-10-CM

## 2018-10-19 DIAGNOSIS — S52.502D CLOSED FRACTURE OF DISTAL END OF LEFT RADIUS WITH ROUTINE HEALING, UNSPECIFIED FRACTURE MORPHOLOGY, SUBSEQUENT ENCOUNTER: Primary | ICD-10-CM

## 2018-10-19 PROCEDURE — 97164 PT RE-EVAL EST PLAN CARE: CPT | Performed by: PHYSICAL THERAPIST

## 2018-10-19 PROCEDURE — 97535 SELF CARE MNGMENT TRAINING: CPT | Performed by: PHYSICAL THERAPIST

## 2018-10-19 PROCEDURE — G8985 CARRY GOAL STATUS: HCPCS | Performed by: PHYSICAL THERAPIST

## 2018-10-19 PROCEDURE — 97110 THERAPEUTIC EXERCISES: CPT | Performed by: PHYSICAL THERAPIST

## 2018-10-19 PROCEDURE — G8984 CARRY CURRENT STATUS: HCPCS | Performed by: PHYSICAL THERAPIST

## 2018-10-19 PROCEDURE — 97140 MANUAL THERAPY 1/> REGIONS: CPT | Performed by: PHYSICAL THERAPIST

## 2018-10-19 NOTE — PROGRESS NOTES
PHYSICAL THERAPY Re EVALUATION NOTE    Date:  10/19/2018   Patient: Litzy Carmen  : 1945  MRN: [id number]  Physician: Evelio Laura MD  [address] [place] [address]       Dx: Closed fracture of distal end of left radius with routine healing, unspecified fracture morphology, subsequent encounter [V60.699C]    Treatment Dx:    Visit Diagnoses       Codes    Primary osteoarthritis of left shoulder     M19.012            Area treated: L wrist    SUBJECTIVE     Pain:  [x] Yes  [] No LocatioN ' L posterior wrist pain =2-5 /10    Daily Comments: pt reports having cont pain and lack of mobility in L wrist but feeling better overall    Outcomes: Dash = 36% disability    OBJECTIVE     See rehab sheet for today's treatment    Time In:1230p            Time Out: 135p             Visit #: 8    Treatment Charges:   Mins Units   Modalities       Ther Exercise   30 2   Neuromuscular Re-Education       Thera Activities       Manual Therapy   25 2   ADL/Home Mgt   10 1   Gait Training       Group Therapy       Non-Billable Service Time       Other     Total Time/Units 65 5     [] Group therapy/ non-billed for portions of treatment that were considered simultaneous activities per certain provider guidelines. [] Pre/post/intraservice treatment measurements included in treatment:    []   Ther Ex                                                                                                                          [x]   Manual therapy                                                                                                           []   Other:    Palpation:  ROM: L wrist AROM   Flex= 47; ext = 53  Ulnar = 20; Radial = 21  Supination = 72; pronation =WNL  Strength:4/5 strength with L wrist flex / extension   4+/5 with ulnar and radial deviation.   Joint Integrity:    ASSESSMENT     Noted improvement with wrist mobility after cavitation during wrist traction and gr 2/3 mobilization to carpal bones along with

## 2018-11-02 ENCOUNTER — TREATMENT (OUTPATIENT)
Dept: PHYSICAL THERAPY | Age: 73
End: 2018-11-02
Payer: MEDICARE

## 2018-11-02 DIAGNOSIS — M19.012 PRIMARY OSTEOARTHRITIS OF LEFT SHOULDER: ICD-10-CM

## 2018-11-02 DIAGNOSIS — S52.502D CLOSED FRACTURE OF DISTAL END OF LEFT RADIUS WITH ROUTINE HEALING, UNSPECIFIED FRACTURE MORPHOLOGY, SUBSEQUENT ENCOUNTER: Primary | ICD-10-CM

## 2018-11-02 PROCEDURE — 97150 GROUP THERAPEUTIC PROCEDURES: CPT | Performed by: PHYSICAL THERAPIST

## 2018-11-02 PROCEDURE — 97110 THERAPEUTIC EXERCISES: CPT | Performed by: PHYSICAL THERAPIST

## 2018-11-02 PROCEDURE — 97140 MANUAL THERAPY 1/> REGIONS: CPT | Performed by: PHYSICAL THERAPIST

## 2018-11-02 NOTE — PROGRESS NOTES
PHYSICAL THERAPY DAILY TREATMENT NOTE    Date:  2018   Patient: Linette Darden  : 1945  MRN: [id number]  Physician: Ángel Cunha MD  [address] [place] [address]       Dx: Closed fracture of distal end of left radius with routine healing, unspecified fracture morphology, subsequent encounter [S52.502D]    Treatment Dx:    Visit Diagnoses       Codes    Primary osteoarthritis of left shoulder     M19.012            Area treated: L wrist/ shoulder    SUBJECTIVE     Pain:  [] Yes  [x] No Location:   Pain Rating: (0-10 scale) /10    Daily Comments: pt reports having improvement with wrist flexion AROM but notes having edema in posterior wrist today. Outcomes:     OBJECTIVE     See rehab sheet for today's treatment    Time In:1125a            Time Out: 1220p             Visit #: 9    Treatment Charges:   Mins Units   Modalities       Ther Exercise   15 1   Neuromuscular Re-Education       Thera Activities       Manual Therapy   25 2   ADL/Home Mgt       Gait Training       Group Therapy   15 1   Non-Billable Service Time       Other     Total Time/Units 55 4     [x] Group therapy/ non-billed for portions of treatment that were considered simultaneous activities per certain provider guidelines. [] Pre/post/intraservice treatment measurements included in treatment:    []   Ther Ex                                                                                                                          []   Manual therapy                                                                                                           []   Other:    Palpation:  ROM:  Strength:  Joint Integrity:    ASSESSMENT     Pt did not want to procede with TDN, due to \"just waking up\"    [] Progressing toward goals.     [] No change  [x] Patient required verbal/tactile cueing for prompting/correction for:shoulder AAROM without scap elevation on L  [x] Patient demonstrates ability to progress intensity for the

## 2018-11-05 ENCOUNTER — OFFICE VISIT (OUTPATIENT)
Dept: ORTHOPEDIC SURGERY | Age: 73
End: 2018-11-05
Payer: MEDICARE

## 2018-11-05 ENCOUNTER — TREATMENT (OUTPATIENT)
Dept: PHYSICAL THERAPY | Age: 73
End: 2018-11-05
Payer: MEDICARE

## 2018-11-05 VITALS
HEIGHT: 68 IN | BODY MASS INDEX: 36.37 KG/M2 | WEIGHT: 240 LBS | TEMPERATURE: 97.2 F | HEART RATE: 45 BPM | DIASTOLIC BLOOD PRESSURE: 80 MMHG | SYSTOLIC BLOOD PRESSURE: 139 MMHG

## 2018-11-05 DIAGNOSIS — S52.502D CLOSED FRACTURE OF DISTAL END OF LEFT RADIUS WITH ROUTINE HEALING, UNSPECIFIED FRACTURE MORPHOLOGY, SUBSEQUENT ENCOUNTER: Primary | ICD-10-CM

## 2018-11-05 PROCEDURE — 97110 THERAPEUTIC EXERCISES: CPT

## 2018-11-05 PROCEDURE — 99213 OFFICE O/P EST LOW 20 MIN: CPT | Performed by: ORTHOPAEDIC SURGERY

## 2018-11-05 PROCEDURE — 97140 MANUAL THERAPY 1/> REGIONS: CPT

## 2018-11-05 RX ORDER — ALENDRONATE SODIUM 70 MG/1
70 TABLET ORAL
COMMUNITY
Start: 2018-10-29

## 2018-11-05 NOTE — PROGRESS NOTES
Chief Complaint:   Chief Complaint   Patient presents with    Wrist Injury     F/u left wrist fx. Pt. states it is . HPI Near 4 months after left wrist nondisplaced distal radius fracture. She states it is coming along but she still has some tenderness although the hypersensitivity is less no longer painful when she is in the shower. She continues to work with physical therapy. Primarily feels limited in wrist flexion. Patient Active Problem List   Diagnosis    Osteoarthritis of left shoulder    Closed fracture of lower end of left radius with routine healing       Past Medical History:   Diagnosis Date    Anxiety     Diabetes ([address]     Hernia, abdominal     History of stomach ulcers     Hx of blood clots age 25s    related to BCP    Hyperlipidemia     Hypertension     Incisional hernia 05/2018    PVC's (premature ventricular contractions)     Rhonchi     history of    Shoulder pain, left     Thyroid disease        Past Surgical History:   Procedure Laterality Date    APPENDECTOMY      CATARACT REMOVAL WITH IMPLANT Bilateral     CHOLECYSTECTOMY      COLONOSCOPY      ENDOSCOPY, COLON, DIAGNOSTIC      HERNIA REPAIR  1984    incisional    PA LAP, INCISIONAL HERNIA REPAIR,REDUCIBLE N/A 5/8/2018    INCISIONAL HERNIA REPAIR LAPAROSCOPIC WITH MESH performed by Milly Washington MD at [facility] Bilateral     TOTAL KNEE ARTHROPLASTY Right        Current Outpatient Prescriptions   Medication Sig Dispense Refill    alendronate (FOSAMAX) 70 MG tablet Take 70 mg by mouth every 7 days       traMADol (ULTRAM) 50 MG tablet Take 50 mg by mouth nightly as needed.  Dextre Allen ibuprofen (ADVIL;MOTRIN) 200 MG tablet Take 400 mg by mouth every 6 hours as needed for Pain      vitamin C (ASCORBIC ACID) 500 MG tablet Take 500 mg by mouth daily      ammonium lactate (LAC-HYDRIN) 12 % lotion Apply to affected area twice daily as needed  0    glimepiride (AMARYL) 2 MG tablet Take 2 mg by mouth Daily with supper      Acetaminophen (TYLENOL 8 HOUR ARTHRITIS PAIN PO) Take by mouth as needed      clindamycin (CLEOCIN) 300 MG capsule Take 300 mg by mouth See Admin Instructions Prior to procedures; contact Dr Janay Meng re: preop instructions      chlorthalidone (HYGROTON) 25 MG tablet Take 25 mg by mouth daily      glimepiride (AMARYL) 2 MG tablet Take 4 mg by mouth every morning (before breakfast)       levothyroxine (SYNTHROID) 100 MCG tablet Take 100 mcg by mouth daily       metFORMIN (GLUCOPHAGE) 500 MG tablet Take 500 mg by mouth 2 times daily (with meals)       simvastatin (ZOCOR) 40 MG tablet Take 40 mg by mouth nightly       potassium chloride (KLOR-CON M) 20 MEQ extended release tablet Take 20 mEq by mouth daily       montelukast (SINGULAIR) 10 MG tablet Take 10 mg by mouth nightly Takes as needed. Take am dos if needed      Multiple Vitamins-Minerals (CENTRUM ADULTS PO) Take 1 tablet by mouth daily Ld 05/02      losartan (COZAAR) 50 MG tablet Take 50 mg by mouth nightly       vitamin D (ERGOCALCIFEROL) 44475 UNITS CAPS capsule Take 50,000 Units by mouth every 30 days       finasteride (PROPECIA) 1 MG tablet Take 1 mg by mouth daily       Melatonin 10 MG TABS Take by mouth       No current facility-administered medications for this visit.         Allergies   Allergen Reactions    Prochlorperazine Edisylate Other (See Comments)    Prochlorperazine Maleate Other (See Comments)     dystonia    Ciprofloxacin Nausea And Vomiting and Rash    Codeine Nausea And Vomiting and Rash    Hydromorphone Palpitations and Other (See Comments)     \"panic I felt like I was having a heart attack\"    Penicillins Nausea And Vomiting and Rash    Shrimp Extract Allergy Skin Test Diarrhea and Nausea And Vomiting     Severe diarrhea    Sulfa Antibiotics Diarrhea, Nausea And Vomiting and Rash       Social History     Social History    Marital status:      Spouse

## 2018-11-09 ENCOUNTER — TREATMENT (OUTPATIENT)
Dept: PHYSICAL THERAPY | Age: 73
End: 2018-11-09
Payer: MEDICARE

## 2018-11-09 ENCOUNTER — TELEPHONE (OUTPATIENT)
Dept: VASCULAR SURGERY | Age: 73
End: 2018-11-09

## 2018-11-09 DIAGNOSIS — S52.502D CLOSED FRACTURE OF DISTAL END OF LEFT RADIUS WITH ROUTINE HEALING, UNSPECIFIED FRACTURE MORPHOLOGY, SUBSEQUENT ENCOUNTER: Primary | ICD-10-CM

## 2018-11-09 DIAGNOSIS — M19.012 PRIMARY OSTEOARTHRITIS OF LEFT SHOULDER: ICD-10-CM

## 2018-11-09 PROCEDURE — 97110 THERAPEUTIC EXERCISES: CPT | Performed by: PHYSICAL THERAPIST

## 2018-11-09 PROCEDURE — 97140 MANUAL THERAPY 1/> REGIONS: CPT | Performed by: PHYSICAL THERAPIST

## 2018-11-12 ENCOUNTER — TREATMENT (OUTPATIENT)
Dept: PHYSICAL THERAPY | Age: 73
End: 2018-11-12
Payer: MEDICARE

## 2018-11-12 DIAGNOSIS — M19.012 PRIMARY OSTEOARTHRITIS OF LEFT SHOULDER: ICD-10-CM

## 2018-11-12 DIAGNOSIS — S52.502D CLOSED FRACTURE OF DISTAL END OF LEFT RADIUS WITH ROUTINE HEALING, UNSPECIFIED FRACTURE MORPHOLOGY, SUBSEQUENT ENCOUNTER: Primary | ICD-10-CM

## 2018-11-12 PROCEDURE — 97164 PT RE-EVAL EST PLAN CARE: CPT | Performed by: PHYSICAL THERAPIST

## 2018-11-12 PROCEDURE — 97140 MANUAL THERAPY 1/> REGIONS: CPT | Performed by: PHYSICAL THERAPIST

## 2018-11-12 PROCEDURE — 97110 THERAPEUTIC EXERCISES: CPT | Performed by: PHYSICAL THERAPIST

## 2018-11-13 PROCEDURE — G8985 CARRY GOAL STATUS: HCPCS | Performed by: PHYSICAL THERAPIST

## 2018-11-13 PROCEDURE — G8986 CARRY D/C STATUS: HCPCS | Performed by: PHYSICAL THERAPIST

## 2018-12-19 ENCOUNTER — OFFICE VISIT (OUTPATIENT)
Dept: VASCULAR SURGERY | Age: 73
End: 2018-12-19
Payer: MEDICARE

## 2018-12-19 VITALS — SYSTOLIC BLOOD PRESSURE: 126 MMHG | HEART RATE: 72 BPM | DIASTOLIC BLOOD PRESSURE: 74 MMHG

## 2018-12-19 DIAGNOSIS — I73.9 PERIPHERAL VASCULAR DISEASE (HCC): Chronic | ICD-10-CM

## 2018-12-19 PROCEDURE — 99203 OFFICE O/P NEW LOW 30 MIN: CPT | Performed by: SURGERY

## 2018-12-19 NOTE — PROGRESS NOTES
Right      Current Medications:   Prior to Admission medications    Medication Sig Start Date End Date Taking? Authorizing Provider   BIOTIN PO Take by mouth   Yes Historical Provider, MD   alendronate (FOSAMAX) 70 MG tablet Take 70 mg by mouth every 7 days  10/29/18  Yes Historical Provider, MD   ammonium lactate (LAC-HYDRIN) 12 % lotion Apply to affected area twice daily as needed 4/9/18  Yes Historical Provider, MD   glimepiride (AMARYL) 2 MG tablet Take 2 mg by mouth Daily with supper   Yes Historical Provider, MD   Acetaminophen (TYLENOL 8 HOUR ARTHRITIS PAIN PO) Take by mouth as needed   Yes Historical Provider, MD   clindamycin (CLEOCIN) 300 MG capsule Take 300 mg by mouth See Admin Instructions Prior to procedures; contact Dr De Munroe re: preop instructions   Yes Historical Provider, MD   chlorthalidone (HYGROTON) 25 MG tablet Take 25 mg by mouth daily   Yes Historical Provider, MD   glimepiride (AMARYL) 2 MG tablet Take 4 mg by mouth every morning (before breakfast)  4/22/17  Yes Historical Provider, MD   levothyroxine (SYNTHROID) 100 MCG tablet Take 100 mcg by mouth daily  4/24/17  Yes Historical Provider, MD   metFORMIN (GLUCOPHAGE) 500 MG tablet Take 500 mg by mouth 2 times daily (with meals)  4/24/17  Yes Historical Provider, MD   simvastatin (ZOCOR) 40 MG tablet Take 40 mg by mouth nightly  4/24/17  Yes Historical Provider, MD   potassium chloride (KLOR-CON M) 20 MEQ extended release tablet Take 20 mEq by mouth daily  5/22/17  Yes Historical Provider, MD   montelukast (SINGULAIR) 10 MG tablet Take 10 mg by mouth nightly Takes as needed.   Take am dos if needed 4/3/17  Yes Historical Provider, MD   Multiple Vitamins-Minerals (CENTRUM ADULTS PO) Take 1 tablet by mouth daily Ld 05/02   Yes Historical Provider, MD   losartan (COZAAR) 50 MG tablet Take 50 mg by mouth nightly  4/5/17  Yes Historical Provider, MD   vitamin D (ERGOCALCIFEROL) 25098 UNITS CAPS capsule Take 50,000 Units by mouth every 30 days

## 2018-12-27 ENCOUNTER — TELEPHONE (OUTPATIENT)
Dept: ORTHOPEDIC SURGERY | Age: 73
End: 2018-12-27

## 2019-12-03 ENCOUNTER — TELEPHONE (OUTPATIENT)
Dept: VASCULAR SURGERY | Age: 74
End: 2019-12-03

## 2020-02-13 ENCOUNTER — APPOINTMENT (OUTPATIENT)
Dept: GENERAL RADIOLOGY | Age: 75
End: 2020-02-13
Payer: MEDICARE

## 2020-02-13 ENCOUNTER — HOSPITAL ENCOUNTER (EMERGENCY)
Age: 75
Discharge: HOME HEALTH | End: 2020-02-13
Payer: MEDICARE

## 2020-02-13 VITALS
HEART RATE: 92 BPM | RESPIRATION RATE: 16 BRPM | DIASTOLIC BLOOD PRESSURE: 102 MMHG | TEMPERATURE: 98.4 F | OXYGEN SATURATION: 97 % | WEIGHT: 240 LBS | BODY MASS INDEX: 37.67 KG/M2 | HEIGHT: 67 IN | SYSTOLIC BLOOD PRESSURE: 181 MMHG

## 2020-02-13 PROCEDURE — 99283 EMERGENCY DEPT VISIT LOW MDM: CPT

## 2020-02-13 PROCEDURE — 73110 X-RAY EXAM OF WRIST: CPT

## 2020-02-13 NOTE — ED PROVIDER NOTES
[facility]     Department of Emergency Medicine   ED  Provider Note  Admit Date/RoomTime: 2/13/2020  4:49 PM  ED Room: 35/35  Chief Complaint   No chief complaint on file. History of Present Illness   Source of history provided by:  patient. History/Exam Limitations: none. Radha Randle is a 76 y.o. old female who has a past medical history of:   Past Medical History:   Diagnosis Date    Anxiety     Diabetes ([address]     Fracture, wrist, open     Hernia, abdominal     History of stomach ulcers     Hx of blood clots age 25s    related to BCP    Hyperlipidemia     Hypertension     Incisional hernia 05/2018    PVC's (premature ventricular contractions)     Rhonchi     history of    Shoulder pain, left     Thyroid disease      presents to the emergency department by private vehicle, for Left wrist pain which occured a few hour(s) prior to arrival.  Cause of complaint: patient tripped over a box while volunteering at the [facility]. There has been a history of previous hand/wrist fracture that healed without requiring surgery. Since onset the symptoms have been moderate in degree. Her pain is aggraveated by movement, use and palpation and relieved by ice and Tylenol. ROS    Pertinent positives and negatives are stated within HPI, all other systems reviewed and are negative. Past Surgical History:  has a past surgical history that includes Total hip arthroplasty (Bilateral); Total knee arthroplasty (Right); Cholecystectomy; Appendectomy; hernia repair (1984); Tonsillectomy; Cataract removal with implant (Bilateral); Endoscopy, colon, diagnostic; Colonoscopy; pr lap, incisional hernia repair,reducible (N/A, 5/8/2018); and back surgery. Social History:  reports that she quit smoking about 44 years ago. Her smoking use included cigarettes. She has a 7.00 pack-year smoking history.  She has never used smokeless tobacco. She reports current alcohol use of about 1.0 standard drinks of alcohol per week. She reports that she does not use drugs. Family History: family history is not on file. Allergies: Prochlorperazine edisylate; Prochlorperazine maleate; Ciprofloxacin; Codeine; Hydromorphone; Penicillins; Shrimp extract allergy skin test; and Sulfa antibiotics    Physical Exam          ED Triage Vitals   BP Temp Temp src Pulse Resp SpO2 Height Weight   -- -- -- -- -- -- -- --     Oxygen Saturation Interpretation: Normal.    Constitutional:  Alert, development consistent with age. Neck:  Normal ROM. Supple. Non-tender. Wrist:  Left  radial.              Tenderness:  moderate. Swelling: None. Deformity: no.            ROM: full range with pain. Skin:  no erythema, rash or wounds noted. Neurovascular: Motor deficit: none. Sensory deficit:   none. Pulse deficit: none. Capillary refill: normal.  Elbow: bilateral            Tenderness: none              Swelling: None. Deformity: no.               ROM: full range of motion. Skin:  no erythema, rash or wounds noted. Hand: bilateral            Tenderness: none              Swelling: None. Deformity: no.               ROM: full range of motion. Skin:  no erythema, rash or wounds noted. Lymphatics: No lymphangitis or adenopathy noted. Neurological:  Oriented. Motor functions intact. Lab / Imaging Results   (All laboratory and radiology results have been personally reviewed by myself)  Labs:  No results found for this visit on 02/13/20. Imaging: All Radiology results interpreted by Radiologist unless otherwise noted. XR WRIST LEFT (MIN 3 VIEWS)   Final Result   Deformity of the distal radius related to a healed fracture. Osteoarthritis. Bony demineralization.         ED Course / Medical Decision Making   Medications - No data to display     Re-examinations:  2/13/2020  Time: 1745   Results

## 2020-10-13 ENCOUNTER — HOSPITAL ENCOUNTER (OUTPATIENT)
Dept: ULTRASOUND IMAGING | Age: 75
Discharge: HOME | End: 2020-10-15
Payer: MEDICARE

## 2020-10-13 PROCEDURE — 76700 US EXAM ABDOM COMPLETE: CPT

## 2020-11-02 ENCOUNTER — HOSPITAL ENCOUNTER (OUTPATIENT)
Dept: ULTRASOUND IMAGING | Age: 75
Discharge: HOME | End: 2020-11-04
Payer: MEDICARE

## 2020-11-02 ENCOUNTER — HOSPITAL ENCOUNTER (OUTPATIENT)
Age: 75
Discharge: HOME | End: 2020-11-04
Payer: MEDICARE

## 2020-11-02 PROCEDURE — 76775 US EXAM ABDO BACK WALL LIM: CPT

## 2021-09-13 ENCOUNTER — OFFICE VISIT (OUTPATIENT)
Dept: ORTHOPEDIC SURGERY | Age: 76
End: 2021-09-13
Payer: MEDICARE

## 2021-09-13 VITALS — BODY MASS INDEX: 35.31 KG/M2 | HEIGHT: 67 IN | WEIGHT: 225 LBS

## 2021-09-13 DIAGNOSIS — M17.12 PRIMARY OSTEOARTHRITIS OF LEFT KNEE: Primary | ICD-10-CM

## 2021-09-13 PROCEDURE — 99213 OFFICE O/P EST LOW 20 MIN: CPT | Performed by: ORTHOPAEDIC SURGERY

## 2021-09-13 RX ORDER — VALSARTAN 80 MG/1
TABLET ORAL
COMMUNITY
Start: 2021-08-03

## 2021-09-13 RX ORDER — IBUPROFEN 800 MG/1
TABLET ORAL
COMMUNITY
Start: 2021-09-07

## 2021-09-13 RX ORDER — SEMAGLUTIDE 1.34 MG/ML
INJECTION, SOLUTION SUBCUTANEOUS
COMMUNITY
Start: 2021-08-05

## 2021-09-13 RX ORDER — POTASSIUM CHLORIDE 1500 MG/1
TABLET, FILM COATED, EXTENDED RELEASE ORAL
COMMUNITY
Start: 2021-09-07

## 2021-09-13 NOTE — PROGRESS NOTES
OZEMPIC, 1 MG/DOSE, 4 MG/3ML SOPN Taking on Fridays      valsartan (DIOVAN) 80 MG tablet TAKE ONE TABLET BY MOUTH ONCE DAILY      potassium chloride (KLOR-CON M) 20 MEQ TBCR extended release tablet TAKE ONE TABLET BY MOUTH ONCE DAILY      ibuprofen (ADVIL;MOTRIN) 800 MG tablet TAKE ONE TABLET BY MOUTH EVERY 6 HOURS AS NEEDED FOR PAIN      BIOTIN PO Take by mouth      vitamin C (ASCORBIC ACID) 500 MG tablet Take 500 mg by mouth daily      ammonium lactate (LAC-HYDRIN) 12 % lotion Apply to affected area twice daily as needed  0    glimepiride (AMARYL) 2 MG tablet Take 2 mg by mouth Daily with supper      Acetaminophen (TYLENOL 8 HOUR ARTHRITIS PAIN PO) Take by mouth as needed      clindamycin (CLEOCIN) 300 MG capsule Take 300 mg by mouth See Admin Instructions Prior to procedures; contact Dr Shalini Rose re: preop instructions      chlorthalidone (HYGROTON) 25 MG tablet Take 25 mg by mouth daily      glimepiride (AMARYL) 2 MG tablet Take 4 mg by mouth every morning (before breakfast)       levothyroxine (SYNTHROID) 100 MCG tablet Take 100 mcg by mouth daily       metFORMIN (GLUCOPHAGE) 500 MG tablet Take 500 mg by mouth 2 times daily (with meals)       montelukast (SINGULAIR) 10 MG tablet Take 10 mg by mouth nightly Takes as needed. Take am dos if needed      Multiple Vitamins-Minerals (CENTRUM ADULTS PO) Take 1 tablet by mouth daily Ld 05/02      vitamin D (ERGOCALCIFEROL) 22156 UNITS CAPS capsule Take 50,000 Units by mouth once a week       finasteride (PROPECIA) 1 MG tablet Take 1 mg by mouth daily       Melatonin 10 MG TABS Take by mouth      alendronate (FOSAMAX) 70 MG tablet Take 70 mg by mouth every 7 days  (Patient not taking: Reported on 9/13/2021)      traMADol (ULTRAM) 50 MG tablet Take 50 mg by mouth nightly as needed.  . (Patient not taking: Reported on 9/13/2021)      simvastatin (ZOCOR) 40 MG tablet Take 40 mg by mouth nightly  (Patient not taking: Reported on 9/13/2021)      losartan (COZAAR) 50 MG tablet Take 50 mg by mouth nightly  (Patient not taking: Reported on 2021)       No current facility-administered medications for this visit. Allergies   Allergen Reactions    Prochlorperazine Edisylate Other (See Comments)    Prochlorperazine Maleate Other (See Comments)     dystonia    Ciprofloxacin Nausea And Vomiting and Rash    Codeine Nausea And Vomiting and Rash    Hydromorphone Palpitations and Other (See Comments)     \"panic I felt like I was having a heart attack\"    Penicillins Nausea And Vomiting and Rash    Shrimp Extract Allergy Skin Test Diarrhea and Nausea And Vomiting     Severe diarrhea    Sulfa Antibiotics Diarrhea, Nausea And Vomiting and Rash       Social History     Socioeconomic History    Marital status:      Spouse name: None    Number of children: None    Years of education: None    Highest education level: None   Occupational History    None   Tobacco Use    Smoking status: Former Smoker     Packs/day: 1.00     Years: 7.00     Pack years: 7.00     Types: Cigarettes     Quit date:      Years since quittin.7    Smokeless tobacco: Never Used   Vaping Use    Vaping Use: Never used   Substance and Sexual Activity    Alcohol use: Yes     Alcohol/week: 1.0 standard drinks     Types: 1 Glasses of wine per week     Comment: rare    Drug use: No    Sexual activity: None   Other Topics Concern    None   Social History Narrative    None     Social Determinants of Health     Financial Resource Strain:     Difficulty of Paying Living Expenses:    Food Insecurity:     Worried About Running Out of Food in the Last Year:     Ran Out of Food in the Last Year:    Transportation Needs:     Lack of Transportation (Medical):      Lack of Transportation (Non-Medical):    Physical Activity:     Days of Exercise per Week:     Minutes of Exercise per Session:    Stress:     Feeling of Stress :    Social Connections:     Frequency of Communication with Friends and Family:     Frequency of Social Gatherings with Friends and Family:     Attends [employer] Services:     Active Member of Clubs or Organizations:     Attends Club or Organization Meetings:     Marital Status:    Intimate Partner Violence:     Fear of Current or Ex-Partner:     Emotionally Abused:     Physically Abused:     Sexually Abused:        History reviewed. No pertinent family history. Review of Systems   No fever, chills, or other constitutionalsymptoms. No numbness or other neuro symptoms. No chest pain. No dyspnea. [unfilled]   Slightly antalgic gait favoring left knee but no assistive device. Some knee varus is noted but no effusion. Full range of motion left knee with some medial joint discomfort. There is no pain on rotation of either hip. Right knee shows total knee arthroplasty incision without effusion or crepitation with full range of motion. Physical Exam    Patient is alert and oriented. Well-developed well-nourished. Pupils equal and reactive. Scleraeanicteric. Neck supple  Lungs clear. Cardiac rate and rhythm regular. Abdomen soft and nontender. Skin warm and dry. XRAY: X-ray today bilateral standing AP knee views with flexion weightbearing and a lateral view of the left knee. There is a cemented right total knee arthroplasty in neutral alignment without evidence of visible wear or lucency. Left knee shows stage III medial joint space narrowing with some patellofemoral degenerative change and varus but no fracture. Impression: Osteoarthritic changes left knee. Intact right total knee arthroplasty. ASSESSMENT/PLAN:    Aki Mccall was seen today for knee pain. Diagnoses and all orders for this visit:    Primary osteoarthritis of left knee  -     XR KNEE BILATERAL STANDING; Future  -     XR KNEE LEFT (1-2 VIEWS);  Future  -     Ambulatory referral to Physical Therapy    Findings and images reviewed with the patient. Resolving knee strain. Should she use her brace as needed. She would like to see PT to establish appropriate knee exercises. No surgical intervention identified at this time. Return if symptoms worsen or fail to improve.        Navdeep Xiong MD    9/13/2021  11:05 AM

## 2021-09-20 ENCOUNTER — EVALUATION (OUTPATIENT)
Dept: PHYSICAL THERAPY | Age: 76
End: 2021-09-20
Payer: MEDICARE

## 2021-09-20 DIAGNOSIS — M17.12 PRIMARY OSTEOARTHRITIS OF LEFT KNEE: Primary | ICD-10-CM

## 2021-09-20 PROCEDURE — 97110 THERAPEUTIC EXERCISES: CPT | Performed by: PHYSICAL THERAPIST

## 2021-09-20 PROCEDURE — 97161 PT EVAL LOW COMPLEX 20 MIN: CPT | Performed by: PHYSICAL THERAPIST

## 2021-09-20 NOTE — PROGRESS NOTES
[address] [facility]   Phone: [phone]   Fax: [phone]         Date:  2021   Patient: Twan Boyd  : 1945  MRN: [id number]  Referring Provider: Ashia Barraza MD  [address]     Medical Diagnosis:     M17.12 (ICD-10-CM) - Primary osteoarthritis of left knee     SUBJECTIVE:     Onset date: 4 weeks    Onset: Sudden onset    Mechanism of Injury: Pt reports that she had issue stepping out of shower resulting in twisting injury to L knee. She had x rays showing stage 3 OA of the L knee. She reports limitations c twisting, ambulating, and stairs. She reports that steroids reduced pain. Previous PT: yes - helped     Medical Management for Current Problem: steroids for teeth, ibuprofen, antibiotic for teeth    Chief complaint: pain, edema, decreased motion, decreased mobility, weakness, inability / limited ability to use leg, difficulty walking, unable to run / difficulty running , difficulty with stairs, limited ability to lift/carry/handle material, limited ability to complete home/outdoor chores/tasks, inability to participate in exercise regimen / fitness program, limited tolerance to weightbearing tasks and weightbearing duration, decreased endurance    Behavior: condition is getting better    Pain: waxing and waning  Current: 0/10     Best: 0/10     Worst:7/10    Symptom Type/Quality: aching  Location[de-identified] Knee: medial     Aggravated by: walking, standing, stairs, inclines, declines, getting in/out of car    Relieved by: rest    Imaging results: XR KNEE LEFT (1-2 VIEWS)    Result Date: 2021  Radiology exam is complete. No Radiologist dictation. Please follow up with ordering provider. XR KNEE BILATERAL STANDING    Result Date: 2021  Radiology exam is complete. No Radiologist dictation. Please follow up with ordering provider.        Past Medical History:  Past Medical History:   Diagnosis Date    Anxiety     Diabetes ([facility] [address]  Fracture, wrist, open     Hernia, abdominal     History of stomach ulcers     Hx of blood clots age 25s    related to BCP    Hyperlipidemia     Hypertension     Incisional hernia 05/2018    PVC's (premature ventricular contractions)     Rhonchi     history of    Shoulder pain, left     Thyroid disease      Past Surgical History:   Procedure Laterality Date    APPENDECTOMY      BACK SURGERY      CATARACT REMOVAL WITH IMPLANT Bilateral     CHOLECYSTECTOMY      COLONOSCOPY      ENDOSCOPY, COLON, DIAGNOSTIC      HERNIA REPAIR  1984    incisional    MD LAP, INCISIONAL HERNIA REPAIR,REDUCIBLE N/A 5/8/2018    INCISIONAL HERNIA REPAIR LAPAROSCOPIC WITH MESH performed by Rowan Briscoe MD at [facility] Bilateral     TOTAL KNEE ARTHROPLASTY Right        Medications:   Current Outpatient Medications   Medication Sig Dispense Refill    OZEMPIC, 1 MG/DOSE, 4 MG/3ML SOPN Taking on Fridays      valsartan (DIOVAN) 80 MG tablet TAKE ONE TABLET BY MOUTH ONCE DAILY      potassium chloride (KLOR-CON M) 20 MEQ TBCR extended release tablet TAKE ONE TABLET BY MOUTH ONCE DAILY      ibuprofen (ADVIL;MOTRIN) 800 MG tablet TAKE ONE TABLET BY MOUTH EVERY 6 HOURS AS NEEDED FOR PAIN      BIOTIN PO Take by mouth      alendronate (FOSAMAX) 70 MG tablet Take 70 mg by mouth every 7 days  (Patient not taking: Reported on 9/13/2021)      traMADol (ULTRAM) 50 MG tablet Take 50 mg by mouth nightly as needed.  . (Patient not taking: Reported on 9/13/2021)      vitamin C (ASCORBIC ACID) 500 MG tablet Take 500 mg by mouth daily      ammonium lactate (LAC-HYDRIN) 12 % lotion Apply to affected area twice daily as needed  0    glimepiride (AMARYL) 2 MG tablet Take 2 mg by mouth Daily with supper      Acetaminophen (TYLENOL 8 HOUR ARTHRITIS PAIN PO) Take by mouth as needed      clindamycin (CLEOCIN) 300 MG capsule Take 300 mg by mouth See Admin Instructions Prior to procedures; contact Dr Merlyn Chapa re: preop instructions      chlorthalidone (HYGROTON) 25 MG tablet Take 25 mg by mouth daily      glimepiride (AMARYL) 2 MG tablet Take 4 mg by mouth every morning (before breakfast)       levothyroxine (SYNTHROID) 100 MCG tablet Take 100 mcg by mouth daily       metFORMIN (GLUCOPHAGE) 500 MG tablet Take 500 mg by mouth 2 times daily (with meals)       simvastatin (ZOCOR) 40 MG tablet Take 40 mg by mouth nightly  (Patient not taking: Reported on 9/13/2021)      montelukast (SINGULAIR) 10 MG tablet Take 10 mg by mouth nightly Takes as needed. Take am dos if needed      Multiple Vitamins-Minerals (CENTRUM ADULTS PO) Take 1 tablet by mouth daily Ld 05/02      losartan (COZAAR) 50 MG tablet Take 50 mg by mouth nightly  (Patient not taking: Reported on 9/13/2021)      vitamin D (ERGOCALCIFEROL) 98662 UNITS CAPS capsule Take 50,000 Units by mouth once a week       finasteride (PROPECIA) 1 MG tablet Take 1 mg by mouth daily       Melatonin 10 MG TABS Take by mouth       No current facility-administered medications for this visit. Occupation: counselor. Physical demands include: sitting. Status: part time.     Exercise regimen: none    Hobbies: walking, reading, flea market    Patient Goals: pain relief, return to prior activity, get back to normal    Precautions/Contraindications: none    OBJECTIVE:     Observations: well nourished female, normal affect    Inspection: normal orthopedic exam, demonstrates generalized pronated posture (forward head, protracted scapula, internally rotated shoulders, and flexed trunk and lower extremities)    Edema: mild global    Gait: antalgic gait, limp L LE    Joint/Motion:    Knee:  Right:   AROM: 130° Flexion,  0° Extension    Left:   AROM: 135° Flexion,  0° Extension      Muscle Length Testing:   Quad: R: 100 L: 100   HS 90/90: R: 150 L: 150   IT Band: R: - L: -    Strength:    Knee:   Right: Hip: 4/5 globally, Knee: Flexion 4/5,  Extension 4/5  Left: Hip: 4/5 globally, Knee: Flexion 4/5,  Extension 4/5    Palpation: Tender to palpation medial joint line, MCL, hip adductors         Special Tests/Functional Screens:    [] Lachman's []+ / [] -    [x] Anterior Drawer []+ / [x] -   [x] Valgus Stress []+ / [x] -  [] Thessaly Test []+ / [] -   [] Mary Kate's Sign []+ / [] -   [] Apley compression: []+ / [] - [] Bounce Home []+ / [] -   [x] Olivia [x]+ / [] -   [] Pivot Shift []+ / [] -   [x] Posterior Drawer []+ / [x] -   [x] Varus Stress []+ / [x] -   [] Patellar Tracking: []+ / [] -     Special test comments: medial OA vs MCL sprain vs degenerative meniscal tear. ASSESSMENT     Outcome Measure:   Lower Extremity Functional Scale (LEFS) 64% impairment    Problems:    Pain reported 0-7/10   ROM decreased    Strength decreased    Decreased functional ability with walking, stairs, standing, sitting, work, ADLs as noted above, IADLs as noted above, use of left lower extremity, limited tolerance to weightbearing tasks and weightbearing duration, bending, reaching, lifting, inability to participate in exercise regimen / fitness program    Reason for Skilled Care: Pt reports to PT following L knee twisting injury. Pt requires skilled care to improve global ROM, strength, stability, and overall fxn mobility needed for ADL, rec, and work activity. [x] There are no barriers affecting plan of care or recovery    [] Barriers to this patient's plan of care or recovery include.     Domestic Concerns:  [x] No  [] Yes:        Long Term goals (4-6 weeks)   Decrease reported pain to 0/10   Increase ROM to 0-135   Increase Strength to 5/5 globally    Able to perform/complete the following functions/tasks: Pt will ambulate for 30 min s AD or limitation, able to negotiate a flight of stairs recip s pain, limitation, or HR, able to perform 10 STS transfers s pain or limitation or HHA      LEFS 0-25% impairment    Independent with Home Exercise Programs    Rehab Potential: [x] Good [] Fair  [] Poor    PLAN       Treatment Plan:   [x] Therapeutic Exercise  [x] Therapeutic Activity  [x] Neuromuscular Re-education   [x] Gait Training  [x] Balance Training  [x] Aerobic conditioning  [x] Manual Therapy  [x] Massage/Fascial release   [] Work/Sport specific activities    [] Pain Neuroscience [x] Cold/hotpack  [x] Vasocompression  [x] Electrical Stimulation  [] Lumbar/Cervical Traction  [x] Ultrasound   [] Iontophoresis: 4 mg/mL Dexamethasone Sodium Phosphate 40-80 mAmin  [x] Dry Needling      [x] Instruction in HEP      []  Medication allergies reviewed for use of Dexamethasone Sodium Phosphate 4mg/ml  with iontophoresis treatments. Patient is not allergic. The following CPT codes are likely to be used in the care of this patient: 28482 PT Evaluation: Low Complexity   18878 PT Re-Evaluation   66192 Therapeutic Exercise   48840 Neuromuscular Re-Education   76560 Therapeutic Activities   59910 Manual Therapy   65059 Group Therapy   10373 Gait Training   72694 Vasopneumatic Device   47209      Suggested Professional Referral: [x] No  [] Yes:     Patient Education:  [x] Plans/Goals, Risks/Benefits discussed  [x] Home exercise program  Method of Education: [x] Verbal  [x] Demo  [x] Written  Comprehension of Education:  [x] Verbalizes understanding. [x] Demonstrates understanding. [] Needs Review. [] Demonstrates/verbalizes understanding of HEP/Ed previously given. Frequency: 1-2 days per week for 4-6 weeks    Patient understands diagnosis/prognosis and consents to treatment, plan and goals: [x] Yes    [] No     Thank you for the opportunity to work with your patient. If you have questions or comments, please contact me at numbers listed above. Electronically signed by: Elisa Davis PT PT DPT [id number]         Please sign Physician's Certification and return to:   [address]  [facility] [address]  Dept: [phone]  Dept Fax: [phone] PT , DPT PT [id number]    [id number] Certification / Comments     Frequency/Duration 1-2 days per week for 4-6 weeks. Certification period from 9/20/2021  to 11/20/2021. I have reviewed the Plan of Care established for skilled therapy services and certify that the services are required and that they will be provided while the patient is under my care.     Physician's Comments/Revisions:               Physician's Printed Name:                                           [de-identified] Signature:                                                               Date:

## 2021-10-12 ENCOUNTER — TREATMENT (OUTPATIENT)
Dept: PHYSICAL THERAPY | Age: 76
End: 2021-10-12
Payer: MEDICARE

## 2021-10-12 DIAGNOSIS — M17.12 PRIMARY OSTEOARTHRITIS OF LEFT KNEE: Primary | ICD-10-CM

## 2021-10-12 PROCEDURE — 97110 THERAPEUTIC EXERCISES: CPT | Performed by: PHYSICAL THERAPIST

## 2021-10-12 NOTE — PROGRESS NOTES
[address] [facility]   Phone: [phone]   Fax: [phone]      Physical Therapy Daily Treatment Note    Date: 10/12/2021  Patient Name: Jose Fox  : 1945   MRN: [id number]  DOInjury: 1 month  DOSx: NA  Referring Provider: Sun Neff MD  [address]     Medical Diagnosis:     M17.12 (ICD-10-CM) - Primary osteoarthritis of left knee    Outcome Measure: LEFS 64% limitation    S: Pt reports anterior knee pain today. She had an incr in back pain following last session  O:   Time 4197-9398     Visit 2/6 Repeat outcome measure at mid point and end. Pain 0/10                       Modalities            Manual            Stretch      IT band supine      L TE   Seated gastroc strap 4x30s holds L  TE   HS seated 4x30s hold L TE         Exercise      Bike 6 min  TE   Heel slides      QS      SLR 2x10 CNP secondary to LBP TE   Seated march x30  B TE   Seated clams x30 B BTB TE   LAQ x20 B [place] TB TE   TG squats      TG calf raises      Step-ups - FWD      Step-ups - LAT      Step-ups - BWD        NMR To improve balance for safe community and home ambulation    Resisted walk      FWD      BKWD      lat      March      Side stepping      Retro walk      Heel to toe      A:  Tolerated well. Pt could not perform supine exercise secondary to fear of LBP. She was tx in seated position this day focusing on LE strength and stability. She tolerated the session well c moderate fatigue and no incr in pain.   P: Continue with rehab plan  Libby Kern, PT DPT, PT [id number]    Treatment Charges: Mins Units   Initial Evaluation     Re-Evaluation     Ther Exercise         TE 40 3   Manual Therapy     MT     Ther Activities        TA     Gait Training          GT     Neuro Re-education NR     Modalities     Non-Billable Service Time     Other     Total Time/Units 40 3

## 2021-10-19 ENCOUNTER — TREATMENT (OUTPATIENT)
Dept: PHYSICAL THERAPY | Age: 76
End: 2021-10-19
Payer: MEDICARE

## 2021-10-19 DIAGNOSIS — M17.12 PRIMARY OSTEOARTHRITIS OF LEFT KNEE: Primary | ICD-10-CM

## 2021-10-19 PROCEDURE — 97530 THERAPEUTIC ACTIVITIES: CPT

## 2021-10-19 PROCEDURE — 97110 THERAPEUTIC EXERCISES: CPT

## 2021-10-19 NOTE — PROGRESS NOTES
[address] [facility]   Phone: [phone]   Fax: [phone]      Physical Therapy Daily Treatment Note    Date: 10/19/2021  Patient Name: Quinton Wilson  : 1945   MRN: [id number]  DOInjury: 1 month  DOSx: NA  Referring Provider: Joanie Pinzon MD  [address]     Medical Diagnosis:     M17.12 (ICD-10-CM) - Primary osteoarthritis of left knee    Outcome Measure: LEFS 64% limitation    S: Pt reports anterior knee pain today. She had an incr in back pain following last session  O:   Time 923-1003     Visit 3/6 Repeat outcome measure at mid point and end. Pain 0/10                       Modalities            Manual            Stretch            L TE   Seated gastroc strap 4x30s holds L  TE   HS seated 4x30s hold L TE         Exercise      Bike 6 min  TE         TE   Seated march  x30  BTB TE   Seated clams x30 BTB TE   LAQ x20 B [place] TB TE         Standing SLR- Flex x20  TA   Standing SLR- Hip abduction x20  TA   Standing SLR Hip extension  x20  TA   Standing Marches  x20  TA                                        A:  Tolerated well. Patient reports she can't perform supine exercises d/t LBP. Progressed c WBing activity this treatment session. Moderate fatigue post treatment session.     P: Continue with rehab plan  Moe Da Silva, PTA [id number]    Treatment Charges: Mins Units   Initial Evaluation     Re-Evaluation     Ther Exercise         TE 25 2   Manual Therapy     MT     Ther Activities        TA 15 1   Gait Training          GT     Neuro Re-education NR     Modalities     Non-Billable Service Time     Other     Total Time/Units 40 3

## 2021-10-26 ENCOUNTER — TREATMENT (OUTPATIENT)
Dept: PHYSICAL THERAPY | Age: 76
End: 2021-10-26
Payer: MEDICARE

## 2021-10-26 DIAGNOSIS — M17.12 PRIMARY OSTEOARTHRITIS OF LEFT KNEE: Primary | ICD-10-CM

## 2021-10-26 PROCEDURE — 97530 THERAPEUTIC ACTIVITIES: CPT

## 2021-10-26 NOTE — PROGRESS NOTES
[address] [facility]   Phone: [phone]   Fax: [phone]      Physical Therapy Daily Treatment Note    Date: 10/26/2021  Patient Name: Rasheed Reeves  : 1945   MRN: [id number]  DOInjury: 1 month  DOSx: NA  Referring Provider: No referring provider defined for this encounter. Medical Diagnosis:     M17.12 (ICD-10-CM) - Primary osteoarthritis of left knee    Outcome Measure: LEFS 64% limitation    S: Karen Carlos reports to therapy c c/o of soreness. O:   Time 4511-4309     Visit 4/6 Repeat outcome measure at mid point and end. Pain 0/10                       Modalities            Manual            Stretch            L TE   L  TE   L TE         Exercise       TE         TE   BTB TE   BTB TE   [place] TB TE         Standing SLR- Flex x20 1.5 lb  TA   Standing SLR- Hip abduction x20 1.5 lb  TA   Standing SLR Hip extension  x20 1.5 lb  TA   Standing Marches  x20 1.5 lb  TA    Standing HR  x20 1.5 lb  TA   Standing hs curls x20 1.5 lb TA         Step-ups -fwd/lat x30  4\" TA   STS x15 c two foam  TA         A:  Tolerated well. Patient was able to progress c standing cuff weights during therapy without increase in pain levels. Will progress c resisted step-outs next session. Patient will continue skilled PT services in order to improve LE stability/strength needed for daily ADL activity.      P: Continue with rehab plan  Sydni Jenkins PTA [id number]    Treatment Charges: Mins Units   Initial Evaluation     Re-Evaluation     Ther Exercise         TE     Manual Therapy     MT     Ther Activities        TA 40 3   Gait Training          GT     Neuro Re-education NR     Modalities     Non-Billable Service Time     Other     Total Time/Units 40 3

## 2021-11-02 ENCOUNTER — TREATMENT (OUTPATIENT)
Dept: PHYSICAL THERAPY | Age: 76
End: 2021-11-02
Payer: MEDICARE

## 2021-11-02 DIAGNOSIS — M17.12 PRIMARY OSTEOARTHRITIS OF LEFT KNEE: Primary | ICD-10-CM

## 2021-11-02 PROCEDURE — 97530 THERAPEUTIC ACTIVITIES: CPT

## 2021-11-09 ENCOUNTER — TREATMENT (OUTPATIENT)
Dept: PHYSICAL THERAPY | Age: 76
End: 2021-11-09
Payer: MEDICARE

## 2021-11-09 DIAGNOSIS — M17.12 PRIMARY OSTEOARTHRITIS OF LEFT KNEE: Primary | ICD-10-CM

## 2021-11-09 PROCEDURE — 97112 NEUROMUSCULAR REEDUCATION: CPT | Performed by: PHYSICAL THERAPIST

## 2021-11-09 PROCEDURE — 97530 THERAPEUTIC ACTIVITIES: CPT | Performed by: PHYSICAL THERAPIST

## 2021-11-09 NOTE — PROGRESS NOTES
[address] [facility]   Phone: [phone]   Fax: [phone]      Physical Therapy Daily Treatment Note    Date: 2021  Patient Name: Rebekah Bello  : 1945   MRN: [id number]  DOInjury: 1 month  DOSx: NA  Referring Provider: No referring provider defined for this encounter. Medical Diagnosis:     M17.12 (ICD-10-CM) - Primary osteoarthritis of left knee    Outcome Measure: LEFS 64% limitation    S: Pt reports she had some pain post squatting but is doing better overall. O:   Time [id number]     Visit 5/6 Repeat outcome measure at mid point and end. Pain 0/10                       Modalities            Manual            Stretch            L TE   L  TE   L TE         Exercise       TE         TE   BTB TE   BTB TE   LAQ x20 B yellow TB NR         TA   TA   TA   TA    TA   Standing hs curls x20 YTB TA         4\" TA   c one foam  TA         Foam Marches  x30   TA    TA   Resisted step-outs x30  RTB TA    TA   Resisted amb x10 ea 4 direction red TB NR           A:  Tolerated well. Pt was progressed today c proprioceptive and functional strength to improve ADL endurance. She tolerated the session well c moderate fatigue and no incr in pain.      P: Continue with rehab plan  Yvette Wheatley, PT DPT [id number]    Treatment Charges: Mins Units   Initial Evaluation     Re-Evaluation     Ther Exercise         TE     Manual Therapy     MT     Ther Activities        TA 25 2   Gait Training          GT     Neuro Re-education NR 15 1   Modalities     Non-Billable Service Time     Other     Total Time/Units 40 3

## 2021-11-16 ENCOUNTER — TREATMENT (OUTPATIENT)
Dept: PHYSICAL THERAPY | Age: 76
End: 2021-11-16
Payer: MEDICARE

## 2021-11-16 DIAGNOSIS — M17.12 PRIMARY OSTEOARTHRITIS OF LEFT KNEE: Primary | ICD-10-CM

## 2021-11-16 PROCEDURE — 97112 NEUROMUSCULAR REEDUCATION: CPT | Performed by: PHYSICAL THERAPIST

## 2021-11-16 PROCEDURE — 97530 THERAPEUTIC ACTIVITIES: CPT | Performed by: PHYSICAL THERAPIST

## 2021-11-16 NOTE — PROGRESS NOTES
[address] [facility]   Phone: [phone]   Fax: [phone]    Discharge Summary        ATTENDANCE:  Patient attended 8 of 8 scheduled treatments from 9/20/2021  to 11/16/2021. TREATMENTS RECEIVED:  Therapeutic activity, therapeutic exercise, neuromuscular reeducation, HEP, manual    INITIAL STATUS:  Observations: well nourished female, normal affect     Inspection: normal orthopedic exam, demonstrates generalized pronated posture (forward head, protracted scapula, internally rotated shoulders, and flexed trunk and lower extremities)     Edema: mild global     Gait: antalgic gait, limp L LE     Joint/Motion:     Knee:  Right:   AROM: 130° Flexion,  0° Extension     Left:   AROM: 135° Flexion,  0° Extension        Muscle Length Testing:              Quad: R: 100 L: 100              HS 90/90: R: 150 L: 150              IT Band: R: - L: -     Strength:     Knee:   Right: Hip: 4/5 globally, Knee: Flexion 4/5,  Extension 4/5  Left: Hip: 4/5 globally, Knee: Flexion 4/5,  Extension 4/5     Palpation: Tender to palpation medial joint line, MCL, hip adductors            Special Tests/Functional Screens:    []? Lachman's []?+ / []? -    [x]? Anterior Drawer []?+ / [x]? -   [x]? Valgus Stress []?+ / [x]? -  []? Thessaly Test []?+ / []? -   []? Mary Kate's Sign []?+ / []? -   []? Apley compression: []?+ / []? - []? Bounce Home []?+ / []? -   [x]? Olivia [x]?+ / []? -   []? Pivot Shift []?+ / []? -   [x]? Posterior Drawer []?+ / [x]? -   [x]? Varus Stress []?+ / [x]? -   []? Patellar Tracking: []?+ / []? -      Special test comments: medial OA vs MCL sprain vs degenerative meniscal tear.   ·     FINAL STATUS:  Observations: well nourished female, normal affect     Inspection: normal orthopedic exam, demonstrates generalized pronated posture (forward head, protracted scapula, internally rotated shoulders, and flexed trunk and lower extremities)     Edema: mild global     Gait: antalgic gait, limp L LE (resolving)     Joint/Motion:     Knee:  Right:   AROM: 130° Flexion,  0° Extension     Left:   AROM: 135° Flexion,  0° Extension        Muscle Length Testing:              Quad: R: 100 L: 100              HS 90/90: R: 150 L: 150              IT Band: R: - L: -     Strength:     Knee:   Right: Hip: 5-/5 globally, Knee: Flexion 5-/5,  Extension 5-/5  Left: Hip: 4+/5 globally, Knee: Flexion 4+/5,  Extension 4+/5     Palpation: Tender to palpation medial joint line, MCL, pes anserine            Special Tests/Functional Screens:    []? Lachman's []?+ / []? -    [x]? Anterior Drawer []?+ / [x]? -   [x]? Valgus Stress []?+ / [x]? -  []? Thessaly Test []?+ / []? -   []? Mary Kate's Sign []?+ / []? -   []? Apley compression: []?+ / []? - []? Bounce Home []?+ / []? -   [x]? Olivia [x]?+ / []? -   []? Pivot Shift []?+ / []? -   [x]? Posterior Drawer []?+ / [x]? -   [x]? Varus Stress []?+ / [x]? -   []? Patellar Tracking: []?+ / []? -      Special test comments: medial OA vs MCL sprain vs degenerative meniscal tear. (resolving)      OUTCOME MEASURE: LEFS 51% limitations    GOALS:        Long Term goals (4-6 weeks) (mostly met)  · Decrease reported pain to 0/10  · Increase ROM to 0-135  · Increase Strength to 5/5 globally   · Able to perform/complete the following functions/tasks: Pt will ambulate for 30 min s AD or limitation, able to negotiate a flight of stairs recip s pain, limitation, or HR, able to perform 10 STS transfers s pain or limitation or HHA  ·    · LEFS 0-25% impairment   · Independent with Home Exercise Programs      REASON FOR DISCHARGE: Pt has progressed well over their PT POC. Pt is reporting considerable improvements in ambulation, stairs, and transfers, but notes some difficulty c prolonged activity. Objectively, they have made considerable improvements in regards to ROM, strength, and stability. Most STG and LTG have been met at this time and pt no longer requires skilled care.   They will be d/c from PT services at this time to I HEP. PATIENT EDUCATION/INSTRUCTIONS: continue HEP as instructed    RECOMMENDATIONS: call c questions or if additional services are warranted. Thank you for the opportunity to work with your patient. If you have questions or comments, please contact me at numbers listed above.       Yuval Malin 94 , DPT PT [id number] 11/16/2021

## 2021-11-16 NOTE — PROGRESS NOTES
[address] [facility]   Phone: [phone]   Fax: [phone]      Physical Therapy Daily Treatment Note    Date: 2021  Patient Name: Amairani Crooks  : 1945   MRN: [id number]  DOInjury: 1 month  DOSx: NA  Referring Provider: Hector Rivera MD  [address]     Medical Diagnosis:     M17.12 (ICD-10-CM) - Primary osteoarthritis of left knee    Outcome Measure: LEFS 51% limitation    S: Pt reports significant pain reduction overall. She is being reevaluated today to determine if additional sessions are warranted. O:   Time 5194-9762     Visit  Repeat outcome measure at mid point and end. Pain 0/10                       Modalities            Manual            Stretch            L TE   L  TE   L TE         Exercise       TE         TE   BTB TE   BTB TE   LAQ x20 B yellow TB NR         TA   TA   TA   TA    TA   Standing hs curls x20 YTB TA         4\" TA   c one foam  TA          TA    TA   Resisted step-outs x30  RTB TA    TA   Resisted amb x10 ea 4 direction red TB NR           A:  Tolerated well. Pt was reevaluated today and was found to no longer require skilled care (see note for details). Pts HEP was reviewed c good understanding and will d/c from PT services are this time.       P: Continue with rehab plan  Roberto Alaniz PT DPT [id number]    Treatment Charges: Mins Units   Initial Evaluation     Re-Evaluation 5    Ther Exercise         TE     Manual Therapy     MT     Ther Activities        TA 13 1   Gait Training          GT     Neuro Re-education NR 12 1   Modalities     Non-Billable Service Time     Other     Total Time/Units 30 2

## 2023-04-24 ENCOUNTER — TELEPHONE (OUTPATIENT)
Dept: ORTHOPEDIC SURGERY | Age: 78
End: 2023-04-24

## 2023-04-24 NOTE — TELEPHONE ENCOUNTER
Patient message states she would like to speak with Bernie Johnson PA-C, did not states reason. Requests call back. No future appointments.

## 2023-04-27 NOTE — TELEPHONE ENCOUNTER
Called patient back, patient states that she has spoke with Sadie Galvan PA-C and has not further questions at this time.

## 2024-02-06 ENCOUNTER — HOSPITAL ENCOUNTER (OUTPATIENT)
Age: 79
Discharge: HOME | End: 2024-02-08

## 2024-02-06 PROCEDURE — 88305 TISSUE EXAM BY PATHOLOGIST: CPT

## 2024-02-08 LAB — SURGICAL PATHOLOGY REPORT: NORMAL

## 2024-11-12 ENCOUNTER — HOSPITAL ENCOUNTER (OUTPATIENT)
Dept: ULTRASOUND IMAGING | Age: 79
Discharge: HOME | End: 2024-11-14
Payer: MEDICARE

## 2024-11-12 ENCOUNTER — HOSPITAL ENCOUNTER (OUTPATIENT)
Age: 79
Discharge: HOME | End: 2024-11-14
Payer: MEDICARE

## 2024-11-12 DIAGNOSIS — M79.605 LEFT LEG PAIN: ICD-10-CM

## 2024-11-12 PROCEDURE — 93971 EXTREMITY STUDY: CPT

## 2024-12-11 ENCOUNTER — TELEPHONE (OUTPATIENT)
Dept: VASCULAR SURGERY | Age: 79
End: 2024-12-11

## 2024-12-11 NOTE — TELEPHONE ENCOUNTER
Received referral from Dr. Reynolds for DVT, left message for patient to schedule appointment to see Dr. Brock.

## 2024-12-13 ENCOUNTER — TELEPHONE (OUTPATIENT)
Dept: VASCULAR SURGERY | Age: 79
End: 2024-12-13

## 2024-12-13 NOTE — TELEPHONE ENCOUNTER
Dr. Reynolds spoke with Dr. Medina in regards to seeing this pt for DVT, message left on her voicemail for a return call to be seen on 12/17/24.

## 2024-12-17 ENCOUNTER — TELEPHONE (OUTPATIENT)
Dept: VASCULAR SURGERY | Age: 79
End: 2024-12-17

## 2024-12-17 NOTE — TELEPHONE ENCOUNTER
Received referral from Dr. Reynolds for Dr. Medina regarding DVT, left message for patient to return call to schedule.

## 2024-12-18 ENCOUNTER — TELEPHONE (OUTPATIENT)
Dept: VASCULAR SURGERY | Age: 79
End: 2024-12-18

## 2024-12-18 NOTE — TELEPHONE ENCOUNTER
Pt returned our calls regarding referral received from Dr. Reynolds.  She was offered an office consultation with Dr. Medina for 12/31/24 but requested to be seen on 1/21/25.

## 2025-02-04 ENCOUNTER — OFFICE VISIT (OUTPATIENT)
Dept: VASCULAR SURGERY | Age: 80
End: 2025-02-04
Payer: MEDICARE

## 2025-02-04 VITALS — DIASTOLIC BLOOD PRESSURE: 74 MMHG | SYSTOLIC BLOOD PRESSURE: 129 MMHG | HEART RATE: 93 BPM | OXYGEN SATURATION: 96 %

## 2025-02-04 DIAGNOSIS — I82.512 CHRONIC DEEP VEIN THROMBOSIS (DVT) OF FEMORAL VEIN OF LEFT LOWER EXTREMITY (HCC): Primary | ICD-10-CM

## 2025-02-04 PROCEDURE — 1159F MED LIST DOCD IN RCRD: CPT | Performed by: NURSE PRACTITIONER

## 2025-02-04 PROCEDURE — 1123F ACP DISCUSS/DSCN MKR DOCD: CPT | Performed by: NURSE PRACTITIONER

## 2025-02-04 PROCEDURE — 99203 OFFICE O/P NEW LOW 30 MIN: CPT | Performed by: NURSE PRACTITIONER

## 2025-02-04 NOTE — PROGRESS NOTES
Vascular Surgery Outpatient Consultation      Chief Complaint   Patient presents with    Surgical Consult     Dvt.        Reason for Consult:  DVT    Requesting Physician:  Dr. Reynolds    HISTORY OF PRESENT ILLNESS:                The patient is a 79 y.o. female who is referred for evaluation of left lower extremity DVT. She is accompanied by her daughter. The patient states November 12 she acutely developed left calf pain and swelling. She was seen by her primary care physician who ordered an ultrasound. The ultrasound was positive for left mid femoral vein extending to the calf veins. She was started on eliquis. Her leg swelling and pain have since resolved.    The patient states that back in the 1960s she had developed left leg pain and was treated with a \"red pill.\" In the 1980s she had left leg pain after a back surgery. She denies being treated with any anticoagulation at that time. She denies any family history of clotting disorders. Her sister had a DVT following a hip replacement.    Past Medical History:        Diagnosis Date    Anxiety     Diabetes (HCC)     Fracture, wrist, open     Hernia, abdominal     History of stomach ulcers     Hx of blood clots age 20s    related to BCP    Hyperlipidemia     Hypertension     Incisional hernia 05/2018    PVC's (premature ventricular contractions)     Rhonchi     history of    Shoulder pain, left     Thyroid disease      Past Surgical History:        Procedure Laterality Date    APPENDECTOMY      BACK SURGERY      CATARACT REMOVAL WITH IMPLANT Bilateral     CHOLECYSTECTOMY      COLONOSCOPY      ENDOSCOPY, COLON, DIAGNOSTIC      HERNIA REPAIR  1984    incisional    DE LAP RPR HRNA XCPT INCAL/INGUN NCRC8/STRANGULATED N/A 5/8/2018    INCISIONAL HERNIA REPAIR LAPAROSCOPIC WITH MESH performed by Cachorro Linton MD at [facility] OR    TONSILLECTOMY      TOTAL HIP ARTHROPLASTY Bilateral     TOTAL KNEE ARTHROPLASTY Right      Current Medications:   Prior to Admission medications

## (undated) DEVICE — BINDER ABD 4-PANEL 12INCH 63-74INCH L N ST

## (undated) DEVICE — LAPAROSCOPIC SCISSORS: Brand: EPIX LAPAROSCOPIC SCISSORS

## (undated) DEVICE — DOUBLE BASIN SET: Brand: MEDLINE INDUSTRIES, INC.

## (undated) DEVICE — GOWN,SIRUS,FABRNF,XL,20/CS: Brand: MEDLINE

## (undated) DEVICE — STAPLER INT DIA5MM 25 ABSRB STRP FIX DISP FOR HERN MESH

## (undated) DEVICE — ELECTRODE PT RET AD L9FT HI MOIST COND ADH HYDRGEL CORDED

## (undated) DEVICE — PACK PROCEDURE SURG GEN CUST

## (undated) DEVICE — TROCAR: Brand: KII SHIELDED BLADED ACCESS SYSTEM

## (undated) DEVICE — TROCAR: Brand: KII® SLEEVE

## (undated) DEVICE — CHLORAPREP 26ML ORANGE

## (undated) DEVICE — INSUFFLATION TUBING SET WITH FILTER, FUNNEL CONNECTOR AND LUER LOCK: Brand: JOSNOE MEDICAL INC

## (undated) DEVICE — NEEDLE CLOSURE OMNICLOSE

## (undated) DEVICE — BLADE CLIPPER GEN PURP NS

## (undated) DEVICE — DRAPE,CHEST,FENES,15X10,STERIL: Brand: MEDLINE

## (undated) DEVICE — SYRINGE MED 20ML STD CLR PLAS LUERLOCK TIP N CTRL DISP

## (undated) DEVICE — STANDARD HYPODERMIC NEEDLE,ALUMINUM HUB: Brand: MONOJECT

## (undated) DEVICE — ANTI-FOG SOLUTION WITH FOAM PAD: Brand: DEVON

## (undated) DEVICE — INTENDED FOR TISSUE SEPARATION, AND OTHER PROCEDURES THAT REQUIRE A SHARP SURGICAL BLADE TO PUNCTURE OR CUT.: Brand: BARD-PARKER ® STAINLESS STEEL BLADES

## (undated) DEVICE — SKIN AFFIX SURG ADHESIVE 72/CS 0.55ML: Brand: MEDLINE

## (undated) DEVICE — TOWEL,OR,DSP,ST,BLUE,STD,6/PK,12PK/CS: Brand: MEDLINE

## (undated) DEVICE — INSUFFLATION NEEDLE TO ESTABLISH PNEUMOPERITONEUM.: Brand: INSUFFLATION NEEDLE

## (undated) DEVICE — SOLUTION IV IRRIG POUR BRL 0.9% SODIUM CHL 2F7124